# Patient Record
Sex: FEMALE | Race: WHITE | NOT HISPANIC OR LATINO | Employment: FULL TIME | URBAN - METROPOLITAN AREA
[De-identification: names, ages, dates, MRNs, and addresses within clinical notes are randomized per-mention and may not be internally consistent; named-entity substitution may affect disease eponyms.]

---

## 2017-01-11 PROBLEM — Z86.0100 HISTORY OF COLONIC POLYPS: Status: ACTIVE | Noted: 2017-01-11

## 2017-01-11 PROBLEM — Z86.010 HISTORY OF COLONIC POLYPS: Status: ACTIVE | Noted: 2017-01-11

## 2017-02-01 ENCOUNTER — APPOINTMENT (OUTPATIENT)
Dept: MEDICAL GROUP | Facility: MEDICAL CENTER | Age: 37
End: 2017-02-01
Payer: COMMERCIAL

## 2017-02-14 ENCOUNTER — OFFICE VISIT (OUTPATIENT)
Dept: MEDICAL GROUP | Facility: MEDICAL CENTER | Age: 37
End: 2017-02-14
Payer: COMMERCIAL

## 2017-02-14 VITALS
OXYGEN SATURATION: 98 % | RESPIRATION RATE: 16 BRPM | TEMPERATURE: 98.3 F | DIASTOLIC BLOOD PRESSURE: 72 MMHG | BODY MASS INDEX: 26.64 KG/M2 | HEART RATE: 72 BPM | SYSTOLIC BLOOD PRESSURE: 106 MMHG | HEIGHT: 66 IN | WEIGHT: 165.79 LBS

## 2017-02-14 DIAGNOSIS — M54.50 CHRONIC BILATERAL LOW BACK PAIN WITHOUT SCIATICA: ICD-10-CM

## 2017-02-14 DIAGNOSIS — G89.29 CHRONIC BILATERAL LOW BACK PAIN WITHOUT SCIATICA: ICD-10-CM

## 2017-02-14 DIAGNOSIS — Z13.220 SCREENING FOR HYPERLIPIDEMIA: ICD-10-CM

## 2017-02-14 DIAGNOSIS — Z13.1 SCREENING FOR DIABETES MELLITUS: ICD-10-CM

## 2017-02-14 DIAGNOSIS — Z86.010 HISTORY OF COLONIC POLYPS: ICD-10-CM

## 2017-02-14 DIAGNOSIS — F17.210 CIGARETTE SMOKER: ICD-10-CM

## 2017-02-14 DIAGNOSIS — Z30.41 ENCOUNTER FOR SURVEILLANCE OF CONTRACEPTIVE PILLS: ICD-10-CM

## 2017-02-14 DIAGNOSIS — Z30.9 ENCOUNTER FOR CONTRACEPTIVE MANAGEMENT, UNSPECIFIED CONTRACEPTIVE ENCOUNTER TYPE: ICD-10-CM

## 2017-02-14 LAB
INT CON NEG: NEGATIVE
INT CON POS: POSITIVE
POC URINE PREGNANCY TEST: NORMAL

## 2017-02-14 PROCEDURE — 99214 OFFICE O/P EST MOD 30 MIN: CPT | Performed by: PHYSICIAN ASSISTANT

## 2017-02-14 PROCEDURE — 81025 URINE PREGNANCY TEST: CPT | Performed by: PHYSICIAN ASSISTANT

## 2017-02-14 ASSESSMENT — PAIN SCALES - GENERAL: PAINLEVEL: NO PAIN

## 2017-02-14 ASSESSMENT — PATIENT HEALTH QUESTIONNAIRE - PHQ9: CLINICAL INTERPRETATION OF PHQ2 SCORE: 0

## 2017-02-14 NOTE — ASSESSMENT & PLAN NOTE
Hasn't been on for last 3mo.   Started taking OCP Cryselle for last 4 years.   Has been in long term relationship.   lmp 2/7/17, last around 5 days. Monthly. Cramping and wren with menses unless on OCP.   Las pap- 2 years ago, normal  Had chlamydia over 10 years ago, but has always been normal since with no other std.   Denies HA, CP, abdominal pain, N/V, dysuria, freq, urgency, hematuria, change in vaginal discharge, lesions or sores
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
## 2017-02-14 NOTE — ASSESSMENT & PLAN NOTE
Started smoking at age 10yo.   Quit for 8mo at age 21yo.   1/2 ppd.   Denies coughing, SOB, wheezing, CP, mouth lesions.
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
## 2017-03-27 ENCOUNTER — OFFICE VISIT (OUTPATIENT)
Dept: MEDICAL GROUP | Facility: MEDICAL CENTER | Age: 37
End: 2017-03-27
Payer: COMMERCIAL

## 2017-03-27 ENCOUNTER — HOSPITAL ENCOUNTER (OUTPATIENT)
Facility: MEDICAL CENTER | Age: 37
End: 2017-03-27
Attending: PHYSICIAN ASSISTANT
Payer: COMMERCIAL

## 2017-03-27 VITALS
HEIGHT: 65 IN | TEMPERATURE: 98.8 F | WEIGHT: 175 LBS | BODY MASS INDEX: 29.16 KG/M2 | OXYGEN SATURATION: 96 % | HEART RATE: 68 BPM | DIASTOLIC BLOOD PRESSURE: 66 MMHG | SYSTOLIC BLOOD PRESSURE: 116 MMHG

## 2017-03-27 DIAGNOSIS — Z01.419 ENCOUNTER FOR GYNECOLOGICAL EXAMINATION: ICD-10-CM

## 2017-03-27 DIAGNOSIS — Z11.51 SCREENING FOR HPV (HUMAN PAPILLOMAVIRUS): ICD-10-CM

## 2017-03-27 DIAGNOSIS — R39.15 URGENCY OF URINATION: ICD-10-CM

## 2017-03-27 DIAGNOSIS — Z12.4 SCREENING FOR CERVICAL CANCER: ICD-10-CM

## 2017-03-27 LAB
APPEARANCE UR: CLEAR
BILIRUB UR STRIP-MCNC: NORMAL MG/DL
COLOR UR AUTO: YELLOW
GLUCOSE UR STRIP.AUTO-MCNC: NORMAL MG/DL
KETONES UR STRIP.AUTO-MCNC: NORMAL MG/DL
LEUKOCYTE ESTERASE UR QL STRIP.AUTO: NORMAL
NITRITE UR QL STRIP.AUTO: NORMAL
PH UR STRIP.AUTO: 5 [PH] (ref 5–8)
PROT UR QL STRIP: NORMAL MG/DL
RBC UR QL AUTO: NORMAL
SP GR UR STRIP.AUTO: 1.01
UROBILINOGEN UR STRIP-MCNC: NORMAL MG/DL

## 2017-03-27 PROCEDURE — 81002 URINALYSIS NONAUTO W/O SCOPE: CPT | Performed by: PHYSICIAN ASSISTANT

## 2017-03-27 PROCEDURE — 99395 PREV VISIT EST AGE 18-39: CPT | Performed by: PHYSICIAN ASSISTANT

## 2017-03-27 PROCEDURE — 88175 CYTOPATH C/V AUTO FLUID REDO: CPT

## 2017-03-27 PROCEDURE — 87086 URINE CULTURE/COLONY COUNT: CPT

## 2017-03-27 PROCEDURE — 87624 HPV HI-RISK TYP POOLED RSLT: CPT

## 2017-03-27 NOTE — PROGRESS NOTES
SUBJECTIVE: 36 y.o. female for annual routine gynecologic exam  Chief Complaint   Patient presents with   • Gynecologic Exam       Obstetric History     No data available      Last Pap: 1/4/16 normal other than BV.   Didn't want treatment. Stated it doesn't bother her   History   Sexual Activity   • Sexual Activity:   • Partners: Male   • Birth Control/ Protection: Pill     H/O Abnormal Pap yes, CH when in 20's treated. No other abnormal. No SG/LN  She is taking OCP and has been doing much better. Monthly menses, mood is more stable.     She has noticed mild urgency of urination from several weeks otherwise no other changes.   She drinks some water during the week and only pepsi and coffee on weekends.   Denies fever, chills, flank pain, pelvic pain, suprapubic pain, dysuria, frequency, hematuria, change in vaginal discharge.    She  reports that she has been smoking Cigarettes.  She has been smoking about 0.50 packs per day. She has never used smokeless tobacco.    LMP Date: 03/08/17, 4-5 days if on OCP.   Allergies: Food; Amoxicillin; Codeine; and Septra     ROS:    Menses every month with 4-5 days    Cramping is moderate.   She does take OTC analgesics for cramping  No significant bloating/fluid retention, pelvic pain, or dyspareunia. No vaginal discharge   No breast tenderness, mass, nipple discharge, changes in size or contour, or abnormal cyclic discomfort.  Reports no menopause symptoms of hot flashes, night sweats, sleep disruption, mood changes.Denies vaginal dryness.   + urgency but neg freq, hematuria, dysuria, no incontinence.   No abdominal pain, change in bowel habits, black or bloody stools.    No unusual headaches, no visual changes, menstrual migraines   No prolonged cough. No dyspnea or chest pain on exertion.  No depression, labile mood, anxiety, libido changes, insomnia.  No polydipsia, polyuria, temperature intolerance.  No new/concerning skin lesions, concerns.     Exercise: through work, very  "active.  5 days weekly.   Preventive Care:  Colonoscopy- 1/10/17 next due 12 years.,   PAP- 1/4/16.   Flu- declines  TDap- 2005.     Current medicines (including changes today)  Current Outpatient Prescriptions   Medication Sig Dispense Refill   • norgestrel-ethinyl estradiol (CRYSELLE-28) 0.3-30 MG-MCG Tab Take 1 Tab by mouth every day. 84 Each 3   • methocarbamol (ROBAXIN) 750 MG Tab Take 1 Tab by mouth 3 times a day as needed. 30 Tab 0   • tramadol (ULTRAM) 50 MG Tab Take 1-2 Tabs by mouth every 6 hours as needed (pain). 20 Tab 0     No current facility-administered medications for this visit.     She  has a past medical history of GERD (gastroesophageal reflux disease).  She  has no past surgical history on file.     Family History:   Family History   Problem Relation Age of Onset   • Alcohol/Drug Sister      etoh   • Cancer Maternal Aunt      uterin ca   • Diabetes Neg Hx    • Heart Disease Neg Hx    • Hypertension Neg Hx    • Hyperlipidemia Neg Hx           OBJECTIVE:   /66 mmHg  Pulse 68  Temp(Src) 37.1 °C (98.8 °F)  Ht 1.651 m (5' 5\")  Wt 79.379 kg (175 lb)  BMI 29.12 kg/m2  SpO2 96%  LMP 03/08/2017  Breastfeeding? No  Body mass index is 29.12 kg/(m^2).    HEAD AND NECK:  Ears normal.  Throat, oral cavity and tongue normal.  Neck supple. No adenopathy or masses in the neck or supraclavicular regions.  No carotid bruits. No thyromegaly. NEURO: Cranial nerves are normal. DTR's normal and symmetric.  CHEST:  Clear, good air entry, no wheezes or rales. HEART:  Regular rate and rhythm.  S1 and S2 normal.  No edema or JVD. ABDOMEN:  Soft without tenderness, guarding, mass or organomegaly.  No CVA tenderness or inguinal adenopathy. EXTREMITIES:  Extremities, reflexes and peripheral pulses are normal. SKIN: color normal, vascularity normal, no edema, temperature normal   No rashes or suspicious skin lesions noted.     Breast Exam: Performed with instruction during examination. No axillary " lymphadenopathy, no skin changes, no dominant masses. No nipple retraction  Pelvic Exam -  Normal external genitalia with no lesions. Normal vaginal mucosa with normal rugation and scant discharge. Cervix with no visible lesions. No cervical motion tenderness. Uterus is normal sized with no masses. No adnexal tenderness or enlargement appreciated. Thin Prep Pap is obtained, vaginal swab is not obtained and specimen(s) sent to lab    <ASSESSMENT and PLAN>    1. Encounter for gynecological examination  Pap smear with HPV cotesting collected today    2. Screening for cervical cancer  Pap smear with HPV cotesting collected today    3. Screening for HPV (human papillomavirus)  Pap smear with HPV cotesting collected today    4. Need for Tdap  State she will come back for that defers at this time.     5. Urgency of urination  Possibly just from all of the caffeinated beverages although UA was positive for moderate blood. Otherwise it was negative.  We will send for culture, recommend increasing water intake and decreasing caffeinated beverages.  Patient is otherwise asymptomatic, she has no flank pain and no abdominal discomfort.  She is not on her menstrual period.     Discussed  feminine hygiene, family planning choices, adequate intake of calcium and vitamin D, diet and exercise, Kegel's exercises   Follow-up in 1 years for next Gyn exam and 3 years for next Pap.   Next office visit for recheck of chronic medical conditions is due in 3 months    Please note that this dictation was created using voice recognition software. I have made every reasonable attempt to correct obvious errors, but I expect that there are errors of grammar and possibly content that I did not discover before finalizing the note.

## 2017-03-28 LAB
CYTOLOGY REG CYTOL: NORMAL
HPV HR 12 DNA CVX QL NAA+PROBE: NEGATIVE
HPV16 DNA SPEC QL NAA+PROBE: NEGATIVE
HPV18 DNA SPEC QL NAA+PROBE: NEGATIVE
SPECIMEN SOURCE: NORMAL

## 2017-03-30 LAB
BACTERIA UR CULT: NORMAL
SIGNIFICANT IND 70042: NORMAL
SITE SITE: NORMAL
SOURCE SOURCE: NORMAL

## 2017-03-31 ENCOUNTER — TELEPHONE (OUTPATIENT)
Dept: MEDICAL GROUP | Facility: MEDICAL CENTER | Age: 37
End: 2017-03-31

## 2017-03-31 DIAGNOSIS — R31.29 MICROSCOPIC HEMATURIA: ICD-10-CM

## 2017-03-31 NOTE — TELEPHONE ENCOUNTER
Patient informed of msg below. Patient would like to know results for urine culture please advise.

## 2017-03-31 NOTE — TELEPHONE ENCOUNTER
----- Message from Jihan Sifuentes PA-C sent at 3/31/2017  6:56 AM PDT -----  Please inform patient of her Pap was normal  Recommend annual visits and Pap in 3 years.  Thank you  Jihan

## 2017-03-31 NOTE — TELEPHONE ENCOUNTER
Urine culture was normal without any bacteria or infection noted.  I would like to send referral to urology or have her return recheck to make sure that the microscopic blood resolves.  Thank you  Jihan

## 2018-01-09 DIAGNOSIS — Z30.41 ENCOUNTER FOR SURVEILLANCE OF CONTRACEPTIVE PILLS: ICD-10-CM

## 2018-01-09 NOTE — TELEPHONE ENCOUNTER
Was the patient seen in the last year in this department? Yes Lov 03/27/2017    Does patient have an active prescription for medications requested? No     Received Request Via: Pharmacy

## 2018-01-11 RX ORDER — NORGESTREL-ETHINYL ESTRADIOL 0.3-0.03MG
1 TABLET ORAL
Qty: 84 TAB | Refills: 3 | Status: SHIPPED | OUTPATIENT
Start: 2018-01-11 | End: 2018-12-03 | Stop reason: SDUPTHER

## 2018-02-18 ENCOUNTER — HOSPITAL ENCOUNTER (EMERGENCY)
Facility: MEDICAL CENTER | Age: 38
End: 2018-02-18
Attending: EMERGENCY MEDICINE
Payer: COMMERCIAL

## 2018-02-18 VITALS
RESPIRATION RATE: 18 BRPM | OXYGEN SATURATION: 97 % | HEIGHT: 67 IN | WEIGHT: 181.88 LBS | TEMPERATURE: 98.3 F | BODY MASS INDEX: 28.55 KG/M2 | SYSTOLIC BLOOD PRESSURE: 138 MMHG | HEART RATE: 99 BPM | DIASTOLIC BLOOD PRESSURE: 88 MMHG

## 2018-02-18 DIAGNOSIS — S61.212D LACERATION OF RIGHT MIDDLE FINGER WITHOUT FOREIGN BODY WITHOUT DAMAGE TO NAIL, SUBSEQUENT ENCOUNTER: ICD-10-CM

## 2018-02-18 PROCEDURE — 304999 HCHG REPAIR-SIMPLE/INTERMED LEVEL 1

## 2018-02-18 PROCEDURE — 700101 HCHG RX REV CODE 250: Performed by: EMERGENCY MEDICINE

## 2018-02-18 PROCEDURE — 99284 EMERGENCY DEPT VISIT MOD MDM: CPT

## 2018-02-18 PROCEDURE — 700111 HCHG RX REV CODE 636 W/ 250 OVERRIDE (IP): Performed by: EMERGENCY MEDICINE

## 2018-02-18 PROCEDURE — 303747 HCHG EXTRA SUTURE

## 2018-02-18 PROCEDURE — 304217 HCHG IRRIGATION SYSTEM

## 2018-02-18 PROCEDURE — 90715 TDAP VACCINE 7 YRS/> IM: CPT | Performed by: EMERGENCY MEDICINE

## 2018-02-18 PROCEDURE — 90471 IMMUNIZATION ADMIN: CPT

## 2018-02-18 RX ORDER — CLINDAMYCIN HYDROCHLORIDE 150 MG/1
150 CAPSULE ORAL 3 TIMES DAILY
Qty: 30 CAP | Refills: 0 | Status: SHIPPED
Start: 2018-02-18 | End: 2019-05-28

## 2018-02-18 RX ORDER — LIDOCAINE HYDROCHLORIDE 10 MG/ML
20 INJECTION, SOLUTION INFILTRATION; PERINEURAL ONCE
Status: COMPLETED | OUTPATIENT
Start: 2018-02-18 | End: 2018-02-18

## 2018-02-18 RX ADMIN — CLOSTRIDIUM TETANI TOXOID ANTIGEN (FORMALDEHYDE INACTIVATED), CORYNEBACTERIUM DIPHTHERIAE TOXOID ANTIGEN (FORMALDEHYDE INACTIVATED), BORDETELLA PERTUSSIS TOXOID ANTIGEN (GLUTARALDEHYDE INACTIVATED), BORDETELLA PERTUSSIS FILAMENTOUS HEMAGGLUTININ ANTIGEN (FORMALDEHYDE INACTIVATED), BORDETELLA PERTUSSIS PERTACTIN ANTIGEN, AND BORDETELLA PERTUSSIS FIMBRIAE 2/3 ANTIGEN 0.5 ML: 5; 2; 2.5; 5; 3; 5 INJECTION, SUSPENSION INTRAMUSCULAR at 21:20

## 2018-02-18 RX ADMIN — LIDOCAINE HYDROCHLORIDE 20 ML: 10 INJECTION, SOLUTION INFILTRATION; PERINEURAL at 21:00

## 2018-02-18 ASSESSMENT — LIFESTYLE VARIABLES
DO YOU DRINK ALCOHOL: YES
HAVE PEOPLE ANNOYED YOU BY CRITICIZING YOUR DRINKING: NO
EVER HAD A DRINK FIRST THING IN THE MORNING TO STEADY YOUR NERVES TO GET RID OF A HANGOVER: NO
HAVE YOU EVER FELT YOU SHOULD CUT DOWN ON YOUR DRINKING: NO

## 2018-02-18 ASSESSMENT — PAIN SCALES - GENERAL: PAINLEVEL_OUTOF10: 6

## 2018-02-19 NOTE — ED NOTES
Pt brought back to rm PUR 75 from triage. Agree with triage assessment, wound exposed, bleeding controlled, denies numbness or tingling. Lac cart at bedside. Pt able to transfer self to bed, family at bedside, call light in reach. Chart up for ERP.

## 2018-02-19 NOTE — DISCHARGE INSTRUCTIONS
Sutured Wound Care  Sutures are stitches that can be used to close wounds. Taking care of your wound properly can help to prevent pain and infection. It can also help your wound to heal more quickly.  HOW TO CARE FOR YOUR SUTURED WOUND  Wound Care  · Keep the wound clean and dry.  · If you were given a bandage (dressing), you should change it at least once per day or as directed by your health care provider. You should also change it if it becomes wet or dirty.  · Keep the wound completely dry for the first 24 hours or as directed by your health care provider. After that time, you may shower or bathe. However, make sure that the wound is not soaked in water until the sutures have been removed.  · Clean the wound one time each day or as directed by your health care provider.  ¨ Wash the wound with soap and water.  ¨ Rinse the wound with water to remove all soap.  ¨ Pat the wound dry with a clean towel. Do not rub the wound.  · After cleaning the wound, apply a thin layer of antibiotic ointment as directed by your health care provider. This will help to prevent infection and keep the dressing from sticking to the wound.  · Have the sutures removed as directed by your health care provider.  General Instructions  · Take or apply medicines only as directed by your health care provider.  · To help prevent scarring, make sure to cover your wound with sunscreen whenever you are outside after the sutures are removed and the wound is healed. Make sure to wear a sunscreen of at least 30 SPF.  · If you were prescribed an antibiotic medicine or ointment, finish all of it even if you start to feel better.  · Do not scratch or pick at the wound.  · Keep all follow-up visits as directed by your health care provider. This is important.  · Check your wound every day for signs of infection. Watch for:    ¨ Redness, swelling, or pain.  ¨ Fluid, blood, or pus.  · Raise (elevate) the injured area above the level of your heart while you  are sitting or lying down, if possible.  · Avoid stretching your wound.  · Drink enough fluids to keep your urine clear or pale yellow.  SEEK MEDICAL CARE IF:  · You received a tetanus shot and you have swelling, severe pain, redness, or bleeding at the injection site.  · You have a fever.  · A wound that was closed breaks open.  · You notice a bad smell coming from the wound.  · You notice something coming out of the wound, such as wood or glass.  · Your pain is not controlled with medicine.  · You have increased redness, swelling, or pain at the site of your wound.  · You have fluid, blood, or pus coming from your wound.  · You notice a change in the color of your skin near your wound.  · You need to change the dressing frequently due to fluid, blood, or pus draining from the wound.  · You develop a new rash.  · You develop numbness around the wound.  SEEK IMMEDIATE MEDICAL CARE IF:  · You develop severe swelling around the injury site.  · Your pain suddenly increases and is severe.  · You develop painful lumps near the wound or on skin that is anywhere on your body.  · You have a red streak going away from your wound.  · The wound is on your hand or foot and you cannot properly move a finger or toe.  · The wound is on your hand or foot and you notice that your fingers or toes look pale or bluish.     This information is not intended to replace advice given to you by your health care provider. Make sure you discuss any questions you have with your health care provider.     Document Released: 01/25/2006 Document Revised: 05/03/2016 Document Reviewed: 12/14/2015  [employer] Patient Education ©2016 [employer]    Have your sutures removed in 7-10 days. Tetanus and Diphtheria Vaccine  Your caregiver has suggested that you receive an immunization to prevent tetanus (lockjaw) and diphtheria. Tetanus and diphtheria are serious and deadly infectious diseases of the past that have been nearly wiped out by  "modern immunizations. Td or DT vaccines (shots) are the immunizations given to help prevent these illnesses. Td is the medical term for a standard tetanus dose, small diphtheria dose. DT means both in standard doses.  ABOUT THE DISEASES  Tetanus (lockjaw) and diphtheria are serious diseases. Tetanus is caused by a germ that lives in the soil. It enters the body through a cut or wound, often caused by a nail or broken piece of glass. You cannot catch tetanus from another person. Diphtheria spreads when germs pass from an infected person to the nose or throat of others.  Tetanus causes serious, painful spasms of all muscles. It can lead to:  · \"Locking\" of the muscles of the jaw and throat, so the patient cannot open his or her mouth or swallow.  · Damage to the heart muscle.  Diphtheria causes a thick coating in the nose, throat, or airway. It can lead to:  · Breathing problems.  · Kidney problems.  · Heart failure.  · Paralysis.  · Death.  ABOUT THE VACCINES   A vaccine is a shot (immunization) that can help prevent a disease. Vaccines have helped lower the rates of getting certain diseases. If people stopped getting vaccinated, more people would develop illnesses.  These vaccines can be used in three ways:  · As catch-up for people who did not get all their doses when they were children.  · As a booster dose every 10 years.  · For protection against tetanus infection, after a wound.  Benefits of the vaccines  Vaccination is the best way to protect against tetanus and diphtheria. Because of vaccination, there are fewer cases of these diseases. Cases are rare in children because most get a routine vaccination with DTP (Diphtheria, Tetanus, and Pertussis), DTaP (Diphtheria, Tetanus, and acellular Pertussis), or DT (Diphtheria and Tetanus) vaccines. There would be many more cases if we stopped vaccinating people. Tetanus kills about 1 in 5 people who are infected.  WHEN SHOULD YOU GET TD VACCINE?  · Td is made for " people 7 years of age and older.  · People who have not gotten at least 3 doses of any tetanus and diphtheria vaccine (DTP, DTaP or DT) during their lifetime should do so using Td. After a person gets the third dose, a Td dose is needed every 10 years all through life. This is because protection fades over time. Booster shots are needed every 10 years.  · Other vaccines may be given at the same time as Td.  You may not know today whether your immunizations are current. The vaccine given today is to protect you from your next cut or injury. It does not offer protection for the current injury. An immune globulin injection may be given, if protection is needed immediately. Check with your caregiver later regarding your immunization status.  Tell your caregiver if the person getting the vaccine:  · Has ever had a serious allergic reaction or other problem with Td, or any other tetanus and diphtheria vaccine (DTP, DTaP, or DT). People who have had a serious allergic reaction should not receive the vaccine.  · Has epilepsy or another nervous system illness.  · Has had Guillain [place] Syndrome (GBS) in the past.  · Now has a moderate or severe illness.  · Is pregnant.  · If you are not sure, ask your caregiver.  WHAT ARE THE RISKS FROM TD VACCINE?  · As with any medicine, there are very small risks that serious problems, even death, could occur after getting a vaccine. However, the risk of a serious side effect from the vaccine is almost zero.  · The risks from the vaccine are much smaller than the risks from the diseases, if people stopped getting vaccinated. Both diseases can cause serious health problems, which are prevented by the vaccine.  · Almost all people who get Td have no problems from it.  Mild problems  If mild problems occur, they usually start within hours to a day or two after vaccination. They may last 1-2 days:  · Soreness, redness, or swelling where the shot was given.  · Headache or  tiredness.  · Occasionally, a low grade fever.  These problems can be worse in adults who get Td vaccine very often. Non-aspirin medicines may be used to reduce soreness.  Severe problems  These problems happen very rarely:  · Serious allergic reaction (at most, occurs in 1 in 1 million vaccinated persons). This occurs almost immediately, and is treatable with medicines. Signs of a serious allergic reaction include:  · Difficulty breathing.  · Hoarseness or wheezing.  · Hives.  · Dizziness.  · Deep, aching pain and muscle wasting in upper arm(s).  Overall, the benefits to you and your family from these vaccines are far greater than the risk.  WHAT TO DO IF THERE IS A SERIOUS REACTION:  · Call a caregiver or get the person to a doctor or emergency room right away.  · Write down what happened, the date and time it happened, and tell your caregiver.  · Ask your caregiver to file a Vaccine Adverse Event Report form or call, toll-free: [phone]  If you want to learn more about this vaccine, ask your caregiver. She/he can give you the vaccine package insert or suggest other sources of information. Also, the National Vaccine Injury Compensation Program gives compensation (payment) for persons thought to be injured by vaccines. For details call, toll-free: (653) 925-3361.  Document Released: 12/15/2001 Document Revised: 03/11/2013 Document Reviewed: 11/04/2010  "[employer]® Patient Information ©2014 [employer].

## 2018-02-19 NOTE — ED PROVIDER NOTES
"ED Provider Note    CHIEF COMPLAINT  Chief Complaint   Patient presents with   • Hand Laceration     Pt cleaning her gun and hook on barrel slide cut her finger, approx 1 cm lac sustained to R middle finger. Bleeding controlled at this time, pt arrives with finger wrapped.        HPI  Kalyn Addison is a 37 y.o. female here for evaluation of a right finger laceration.  Pt is right handed, and cleaning her gun, when it slipped out of her hand.  No fever. No vomiting. No other injuries.      PAST MEDICAL HISTORY   has a past medical history of GERD (gastroesophageal reflux disease).    SOCIAL HISTORY  Social History     Social History Main Topics   • Smoking status: Current Every Day Smoker     Packs/day: 0.50     Types: Cigarettes   • Smokeless tobacco: Never Used   • Alcohol use 0.0 oz/week      Comment: 2 drinks weekly   • Drug use: No   • Sexual activity: Yes     Partners: Male     Birth control/ protection: Pill       SURGICAL HISTORY  patient denies any surgical history    CURRENT MEDICATIONS  Home Medications     Reviewed by Monica Chin R.N. (Registered Nurse) on 02/18/18 at 2033  Med List Status: Complete   Medication Last Dose Status   CRYSELLE-28 0.3-30 MG-MCG Tab 2/18/2018 Active                ALLERGIES  Allergies   Allergen Reactions   • Food      Rye  Causes swelling in the throat   • Amoxicillin Vomiting   • Codeine Rash   • Septra [Bactrim] Rash       REVIEW OF SYSTEMS  See HPI for further details. Review of systems as above, otherwise all other systems are negative.     PHYSICAL EXAM  VITAL SIGNS: /73   Pulse 99   Temp 36.8 °C (98.3 °F)   Resp 16   Ht 1.702 m (5' 7\")   Wt 82.5 kg (181 lb 14.1 oz)   SpO2 95%   BMI 28.49 kg/m²     Constitutional: Well developed, well nourished. No acute distress.  HEENT: Normocephalic, atraumatic. MMM  Neck: Supple, Full range of motion   Chest/Pulmonary:  No respiratory distress.  Equal expansion   Musculoskeletal: No deformity, no edema, " neurovascular intact.   Right hand:  Dorsal aspect middle digit, with 1cm linear laceration.  No active bleed.  N/V intact distally.  Flex/ext intact.   Neuro: Clear speech, appropriate, cooperative, cranial nerves II-XII grossly intact.  Psych: Normal mood and affect      PROCEDURES   LACERATION REPAIR PROCEDURE NOTE  The patient's identification was confirmed and consent was obtained.  This procedure was performed by  Student doctor anthony bergeron, with my observation and intervention.  Site: right middle finger   Sterile procedures observed;yes    Anesthetic used (type and amt): lidocaine without epi  Suture type/size: 5.0 eithilon  Length:1cm  # of Sutures:  2  Technique:interrupted  Complexity ;  simple  Antibx ointment applied; yes  Tetanus UTD or ordered; yes  Splint placed.   Site anesthetized, irrigated with NS, explored without evidence of foreign body, wound well approximated, site covered with dry, sterile dressing. Patient tolerated procedure well without complications. Instructions for care discussed verbally and patient provided with additional written instructions for homecare and f/u.      MEDICAL RECORD  I have reviewed patient's medical record and pertinent results are listed above.    COURSE & MEDICAL DECISION MAKING  I have reviewed any medical record information, laboratory studies and radiographic results as noted above.    I you have had any blood pressure issues while here in the emergency department, please see your doctor for a further evaluation or work up.    Differential diagnoses include but not limited to: laceration     This patient presents with a right hand laceration and tetanus update.  At this time, I have counseled the patient/family regarding their medications, pain control, and follow up.  They will continue their medications, if any, as prescribed.  They will return immediately for any worsening symptoms and/or any other medical concerns.  They will see their doctor, or  contact the doctor provided, in 1-2 days for follow up.       FINAL IMPRESSION  1. Laceration of right middle finger without foreign body without damage to nail, subsequent encounter    2.      Tetanus update        Electronically signed by: Karthik Lassiter, 2/18/2018 8:42 PM

## 2018-02-19 NOTE — ED TRIAGE NOTES
"Chief Complaint   Patient presents with   • Hand Laceration     Pt cleaning her gun and hook on barrel slide cut her finger, approx 1 cm lac sustained to R middle finger. Bleeding controlled at this time, pt arrives with finger wrapped.      /73   Pulse 99   Temp 36.8 °C (98.3 °F)   Resp 16   Ht 1.702 m (5' 7\")   Wt 82.5 kg (181 lb 14.1 oz)   SpO2 95%   BMI 28.49 kg/m²     Pt ambulatory to triage, steady on feet. Presents for above complaint. Pt returned to [facility], educated on triage process and wait times, instructed to notify staff for worsening symptoms/bleeding.   "

## 2018-02-19 NOTE — ED NOTES
Pts tetanus updated. Pt given discharge instructions. Pt verbalized understanding. RN to answer any questions pt and family had.  VSS. Pt ambulated out to front lobby.

## 2018-02-25 ENCOUNTER — OFFICE VISIT (OUTPATIENT)
Dept: URGENT CARE | Facility: PHYSICIAN GROUP | Age: 38
End: 2018-02-25
Payer: COMMERCIAL

## 2018-02-25 VITALS
HEIGHT: 67 IN | HEART RATE: 72 BPM | DIASTOLIC BLOOD PRESSURE: 80 MMHG | OXYGEN SATURATION: 99 % | WEIGHT: 182 LBS | RESPIRATION RATE: 14 BRPM | SYSTOLIC BLOOD PRESSURE: 118 MMHG | TEMPERATURE: 97.5 F | BODY MASS INDEX: 28.56 KG/M2

## 2018-02-25 DIAGNOSIS — Z48.02 VISIT FOR SUTURE REMOVAL: ICD-10-CM

## 2018-02-25 PROCEDURE — 99212 OFFICE O/P EST SF 10 MIN: CPT | Performed by: PHYSICIAN ASSISTANT

## 2018-02-25 ASSESSMENT — ENCOUNTER SYMPTOMS
CHILLS: 0
TINGLING: 0
SENSORY CHANGE: 0
FOCAL WEAKNESS: 0
FEVER: 0

## 2018-02-25 NOTE — PROGRESS NOTES
"Subjective:      Kalyn Addison is a 37 y.o. female who presents with Suture / Staple Removal (stitch removal )            Suture / Staple Removal   The sutures were placed 7 to 10 days ago (7 days ago. Suture placed in right middle finger). She tried oral antibiotics and regular soap and water washings since the wound repair. The treatment provided significant relief. There has been no drainage from the wound. There is no redness present. There is no swelling present. There is no pain present. She has no difficulty moving the affected extremity or digit.       Past Medical History:   Diagnosis Date   • GERD (gastroesophageal reflux disease)        History reviewed. No pertinent surgical history.    Family History   Problem Relation Age of Onset   • Alcohol/Drug Sister      etoh   • Cancer Maternal Aunt      uterin ca   • Diabetes Neg Hx    • Heart Disease Neg Hx    • Hypertension Neg Hx    • Hyperlipidemia Neg Hx        Allergies   Allergen Reactions   • Food      Rye  Causes swelling in the throat   • Amoxicillin Vomiting   • Codeine Rash   • Septra [Bactrim] Rash       Medications, Allergies, and current problem list reviewed today in Epic    Review of Systems   Constitutional: Negative for chills, fever and malaise/fatigue.   Musculoskeletal: Negative for joint pain.   Skin:        Laceration to right middle finger- 2 sutures in place     Neurological: Negative for tingling, sensory change and focal weakness.     All other systems reviewed and are negative.        Objective:     /80   Pulse 72   Temp 36.4 °C (97.5 °F)   Resp 14   Ht 1.702 m (5' 7\")   Wt 82.6 kg (182 lb)   SpO2 99%   BMI 28.51 kg/m²      Physical Exam   Constitutional: She is oriented to person, place, and time. She appears well-developed and well-nourished. No distress.   Pulmonary/Chest: Effort normal. No respiratory distress.   Musculoskeletal:        Hands:  Neurological: She is alert and oriented to person, place, and time. No " cranial nerve deficit.   Psychiatric: She has a normal mood and affect. Her behavior is normal. Judgment and thought content normal.               Assessment/Plan:     1. Visit for suture removal    2 sutures removed- slight upper layer wound dehiscence.  1 small steri strip placed for reinforcement.     Differential diagnoses, Supportive care, and indications for immediate follow-up discussed with patient.   Instructed to return to clinic or nearest emergency department for any change in condition, further concerns, or worsening of symptoms.    The patient demonstrated a good understanding and agreed with the treatment plan.    Caro Carlson P.A.-C.

## 2018-03-31 ENCOUNTER — OFFICE VISIT (OUTPATIENT)
Dept: URGENT CARE | Facility: PHYSICIAN GROUP | Age: 38
End: 2018-03-31
Payer: COMMERCIAL

## 2018-03-31 VITALS
DIASTOLIC BLOOD PRESSURE: 76 MMHG | BODY MASS INDEX: 28.25 KG/M2 | HEART RATE: 89 BPM | WEIGHT: 180 LBS | SYSTOLIC BLOOD PRESSURE: 122 MMHG | OXYGEN SATURATION: 96 % | RESPIRATION RATE: 16 BRPM | TEMPERATURE: 98.4 F | HEIGHT: 67 IN

## 2018-03-31 DIAGNOSIS — L30.9 DERMATITIS: ICD-10-CM

## 2018-03-31 PROCEDURE — 99213 OFFICE O/P EST LOW 20 MIN: CPT | Performed by: NURSE PRACTITIONER

## 2018-03-31 ASSESSMENT — ENCOUNTER SYMPTOMS
MYALGIAS: 0
NAUSEA: 0
SINUS PAIN: 0
SHORTNESS OF BREATH: 0
NEUROLOGICAL NEGATIVE: 1
RESPIRATORY NEGATIVE: 1
SORE THROAT: 0
CHILLS: 0
PALPITATIONS: 0
WHEEZING: 0
CARDIOVASCULAR NEGATIVE: 1
DIZZINESS: 0
WEAKNESS: 0
HEADACHES: 0
GASTROINTESTINAL NEGATIVE: 1
FEVER: 0
VOMITING: 0
EYES NEGATIVE: 1
ORTHOPNEA: 0

## 2018-03-31 NOTE — PROGRESS NOTES
"Subjective:      Kalyn Addison is a 37 y.o. female who presents with Rash (was on antibiotic when the rash started x 3 weeks )            HPI  Kalyn is here for rash x 3 weeks. See student notes for exam and findings.    PMH:  has a past medical history of GERD (gastroesophageal reflux disease).  MEDS:   Current Outpatient Prescriptions:   •  CRYSELLE-28 0.3-30 MG-MCG Tab, TAKE 1 TAB BY MOUTH EVERY DAY., Disp: 84 Tab, Rfl: 3  •  clindamycin (CLEOCIN) 150 MG Cap, Take 1 Cap by mouth 3 times a day., Disp: 30 Cap, Rfl: 0  ALLERGIES:   Allergies   Allergen Reactions   • Food      Rye  Causes swelling in the throat   • Amoxicillin Vomiting   • Clindamycin Rash     Rash     • Codeine Rash   • Septra [Bactrim] Rash     SURGHX: History reviewed. No pertinent surgical history.  SOCHX:  reports that she has been smoking Cigarettes.  She has been smoking about 0.50 packs per day. She has never used smokeless tobacco. She reports that she drinks alcohol. She reports that she does not use drugs.  FH: Family history was reviewed, no pertinent findings to report    Review of Systems   Constitutional: Negative for chills, fever and malaise/fatigue.   HENT: Negative for congestion, ear pain, sinus pain and sore throat.    Respiratory: Negative for shortness of breath and wheezing.    Cardiovascular: Negative for chest pain, palpitations and orthopnea.   Gastrointestinal: Negative for nausea and vomiting.   Musculoskeletal: Negative for myalgias.   Skin: Positive for itching and rash.   Neurological: Negative for dizziness, weakness and headaches.   Endo/Heme/Allergies: Negative for environmental allergies.   All other systems reviewed and are negative.         Objective:     /76   Pulse 89   Temp 36.9 °C (98.4 °F)   Resp 16   Ht 1.702 m (5' 7\")   Wt 81.6 kg (180 lb)   LMP 03/10/2018   SpO2 96%   BMI 28.19 kg/m²      Physical Exam   Constitutional: She appears well-developed and well-nourished. No distress.   Skin: She is " not diaphoretic.   Vitals reviewed.              Assessment/Plan:     1. Dermatitis    I concur with student notes and findings, recommendations.

## 2018-03-31 NOTE — NON-PROVIDER
CC: had concerns including Rash (was on antibiotic when the rash started x 3 weeks ).    HPI: Patient presents for new rash present for the last 3 weeks. Patient states rash began when she started taking clindamycin for a laceration to her finger. The rash appeared on her leg, she notfied the prescribing provider office who directed her pharmacist for recommendation. Pharmacy advised patient that a small rash is an expected side effect of this medication and she should continue to take it as prescribed. Patient followed this instruction. Rash has remained. Lesion over left thigh has resolved, patient states abdominal lesions remain, patient describes as itchy, not just at site of lesions but generally. New singular lesion on right upper arm appeared today. Denies lesions being fluid filled, raised or in any way different than they appear today. Patient has not tried any home remedies for this condition. Has multiple antibiotic allergies, has epi-pen and benadryl to use PRN. She states she is sensitive to multiple medications. Benadryl causes severe sedation so she avoid use unless absolutely necessary.    PMH/PSH:  has a past medical history of GERD (gastroesophageal reflux disease).    Social:  reports that she has been smoking Cigarettes.  She has been smoking about 0.50 packs per day. She has never used smokeless tobacco. She reports that she drinks alcohol. She reports that she does not use drugs.    Current Outpatient Prescriptions on File Prior to Visit   Medication Sig Dispense Refill   • CRYSELLE-28 0.3-30 MG-MCG Tab TAKE 1 TAB BY MOUTH EVERY DAY. 84 Tab 3   • clindamycin (CLEOCIN) 150 MG Cap Take 1 Cap by mouth 3 times a day. 30 Cap 0     No current facility-administered medications on file prior to visit.      Allergies: Amoxicillin; Codeine; and Septra [bactrim]    Review of Systems   Constitutional: Negative for chills and fever.   HENT: Negative.    Eyes: Negative.    Respiratory: Negative.     Cardiovascular: Negative.    Gastrointestinal: Negative.    Genitourinary: Negative.    Skin: Positive for itching and rash.   Neurological: Negative.    All other systems reviewed and are negative.    Physical Exam   Constitutional: She is oriented to person, place, and time and well-developed, well-nourished, and in no distress. Vital signs are normal.   HENT:   Head: Normocephalic.   Eyes: Pupils are equal, round, and reactive to light.   Cardiovascular: Normal rate and regular rhythm.    Pulmonary/Chest: Effort normal.   Neurological: She is oriented to person, place, and time.   Skin: Skin is warm and dry. Rash noted.            Assessment/Plan:    1. Dermatitis    Possible initial reaction to clindamycin, has had rash reactions to other antibiotics in the past.     Offered patient oral steroid course for rash, patient declined due to concerns about medication sensitivities. Advised patient to take OTC benadryl for 2 days, OTC allegra for 1 week, OTC Pepcid for 1 week and OTC cortisone cream to affected areas twice per day until cleared. Patient to return to clinic in 1 week if not improved.

## 2018-09-03 ENCOUNTER — OFFICE VISIT (OUTPATIENT)
Dept: URGENT CARE | Facility: PHYSICIAN GROUP | Age: 38
End: 2018-09-03
Payer: COMMERCIAL

## 2018-09-03 VITALS
SYSTOLIC BLOOD PRESSURE: 112 MMHG | BODY MASS INDEX: 28.98 KG/M2 | TEMPERATURE: 98.4 F | DIASTOLIC BLOOD PRESSURE: 68 MMHG | WEIGHT: 185 LBS | HEART RATE: 75 BPM | OXYGEN SATURATION: 98 % | RESPIRATION RATE: 16 BRPM

## 2018-09-03 DIAGNOSIS — B35.3 TINEA PEDIS OF BOTH FEET: ICD-10-CM

## 2018-09-03 DIAGNOSIS — L30.9 DERMATITIS: ICD-10-CM

## 2018-09-03 PROCEDURE — 99214 OFFICE O/P EST MOD 30 MIN: CPT | Performed by: FAMILY MEDICINE

## 2018-09-03 RX ORDER — PRENATAL VIT 91/IRON/FOLIC/DHA 28-975-200
COMBINATION PACKAGE (EA) ORAL
Qty: 1 TUBE | Refills: 0 | Status: SHIPPED | OUTPATIENT
Start: 2018-09-03 | End: 2019-05-28

## 2018-09-03 RX ORDER — TRIAMCINOLONE ACETONIDE 1 MG/G
OINTMENT TOPICAL
Qty: 1 TUBE | Refills: 1 | Status: SHIPPED | OUTPATIENT
Start: 2018-09-03 | End: 2019-05-28

## 2018-09-06 ENCOUNTER — OFFICE VISIT (OUTPATIENT)
Dept: URGENT CARE | Facility: PHYSICIAN GROUP | Age: 38
End: 2018-09-06
Payer: COMMERCIAL

## 2018-09-06 VITALS
HEIGHT: 67 IN | SYSTOLIC BLOOD PRESSURE: 108 MMHG | OXYGEN SATURATION: 96 % | BODY MASS INDEX: 29.03 KG/M2 | HEART RATE: 74 BPM | DIASTOLIC BLOOD PRESSURE: 72 MMHG | RESPIRATION RATE: 18 BRPM | TEMPERATURE: 98.2 F | WEIGHT: 185 LBS

## 2018-09-06 DIAGNOSIS — L28.2 PRURITIC RASH: ICD-10-CM

## 2018-09-06 PROCEDURE — 99213 OFFICE O/P EST LOW 20 MIN: CPT | Performed by: EMERGENCY MEDICINE

## 2018-09-06 RX ORDER — HYDROXYZINE HYDROCHLORIDE 25 MG/1
25 TABLET, FILM COATED ORAL 3 TIMES DAILY PRN
Qty: 30 TAB | Refills: 0 | Status: SHIPPED | OUTPATIENT
Start: 2018-09-06 | End: 2019-05-28

## 2018-09-06 RX ORDER — FAMOTIDINE 20 MG/1
20 TABLET, FILM COATED ORAL 2 TIMES DAILY
COMMUNITY
End: 2019-05-28

## 2018-09-06 ASSESSMENT — ENCOUNTER SYMPTOMS
CHILLS: 0
SPEECH CHANGE: 0
NAUSEA: 0
SENSORY CHANGE: 0
SENSORY CHANGE: 0
ABDOMINAL PAIN: 0
FOCAL WEAKNESS: 0
EYE DISCHARGE: 0
VOMITING: 0
EYE DISCHARGE: 0
FEVER: 0
FEVER: 0
EYE REDNESS: 0
NERVOUS/ANXIOUS: 1
SHORTNESS OF BREATH: 0
EYE REDNESS: 0
DIAPHORESIS: 0
MYALGIAS: 0
CHILLS: 0

## 2018-09-06 NOTE — PROGRESS NOTES
Subjective:      Kalyn Addison is a 37 y.o. female who presents with Rash (has spread to the legs and very upset and anxious)            HPI    Patient 37-year-old anxious female recently seen for foot rash felt to be  related to tinea pedis was placed on treatment steroids and antifungal medications, patient states that it is improving now comes in with a rash on her entire body very sparse very pruritic and painful. Patient denies any fever chills nausea vomiting or diarrhea.    Patient states the rash that extends over her abdomen and medial thighs as well as her extremities is extremely painful pruritic sparsely distributed. She denies any exposure to insects pituitary scabies no history of change in her deodorants detergents or soaps. She uses Dove soap as well as, hypoallergenic laundry detergent.        PMH:  has a past medical history of GERD (gastroesophageal reflux disease).  MEDS:   Current Outpatient Prescriptions:   •  famotidine (PEPCID) 20 MG Tab, Take 20 mg by mouth 2 times a day., Disp: , Rfl:   •  triamcinolone acetonide (KENALOG) 0.1 % Ointment, Apply thin layer to affected area twice daily as needed, Disp: 1 Tube, Rfl: 1  •  terbinafine (LAMISIL) 1 % cream, Apply to affected area twice daily, Disp: 1 Tube, Rfl: 0  •  CRYSELLE-28 0.3-30 MG-MCG Tab, TAKE 1 TAB BY MOUTH EVERY DAY., Disp: 84 Tab, Rfl: 3  •  RaNITidine HCl (ZANTAC PO), Take  by mouth., Disp: , Rfl:   •  clindamycin (CLEOCIN) 150 MG Cap, Take 1 Cap by mouth 3 times a day., Disp: 30 Cap, Rfl: 0  ALLERGIES:   Allergies   Allergen Reactions   • Food      Rye  Causes swelling in the throat   • Amoxicillin Vomiting   • Clindamycin Rash     Rash     • Codeine Rash   • Septra [Bactrim] Rash     SURGHX: No past surgical history on file.  SOCHX:  reports that she has been smoking Cigarettes.  She has been smoking about 0.50 packs per day. She has never used smokeless tobacco. She reports that she drinks alcohol. She reports that she does not use  "drugs.  FH: family history includes Alcohol/Drug in her sister; Cancer in her maternal aunt.  Review of Systems   Constitutional: Negative for chills, diaphoresis, fever and malaise/fatigue.   HENT: Negative for congestion.    Eyes: Negative for discharge and redness.   Cardiovascular: Negative for chest pain.   Gastrointestinal: Negative for abdominal pain, nausea and vomiting.   Skin: Positive for itching and rash.        Sparsely distributed 1-2 mm slightly erythematous skin lesions uniformly distributed over her torso or extremities.   Neurological: Negative for sensory change and speech change.   Psychiatric/Behavioral: The patient is nervous/anxious.           Objective:     /72   Pulse 74   Temp 36.8 °C (98.2 °F)   Resp 18   Ht 1.702 m (5' 7\")   Wt 83.9 kg (185 lb)   SpO2 96%   BMI 28.98 kg/m²      Physical Exam   Constitutional: She appears well-developed and well-nourished. She appears distressed.   HENT:   Head: Normocephalic and atraumatic.   Left Ear: External ear normal.   Eyes: Right eye exhibits discharge. Left eye exhibits no discharge.   Neck: Normal range of motion.   Cardiovascular: Normal rate.    Pulmonary/Chest: Effort normal and breath sounds normal.   Musculoskeletal: Normal range of motion. She exhibits no edema or tenderness.   Skin: Skin is warm and dry. She is not diaphoretic. No erythema.   1-2 mm erythematous lesions sparsely distributed over her entire body were concentrated over her lower abdomen medial thighs. She states that they're both painful and seemed extremely pruritic.-   Psychiatric: She has a normal mood and affect.               Assessment/Plan:     Diagnosis: Pruritic rash                       Anxiety.    Patient was given Atarax used in place of Benadryl given referral to dermatology I recommended she consider going to [facility] because of the lesser week time. Weight ×24-6 months. Patient will follow-up with her PCP. Patient was given a note for work " today.

## 2018-09-06 NOTE — PROGRESS NOTES
Subjective:      Kalyn Addison is a 37 y.o. female who presents with Rash (rash on nipples x 3 weeks/ rash on bottoms of feet,x 2months/spider bite R calf x 1day)            Patient with months of skin problems.  She has what she suspects is athlete's foot and has not responded to over-the-counter medications.  Rash is itching and scaling.  She also has itching rash on bilateral nipples.  She does have a new bra however the rash was there before.  2 days of suspected insect bite right calf.  No obvious puncture wound.  No drainage.  No fever.  No leg swelling.        Review of Systems   Constitutional: Negative for chills and fever.   Eyes: Negative for discharge and redness.   Respiratory: Negative for shortness of breath.    Musculoskeletal: Negative for joint pain and myalgias.   Skin: Negative for itching.        No scalp involvement.   Neurological: Negative for sensory change and focal weakness.     .  Medications, Allergies, and current problem list reviewed today in Epic  No past medical history chronic dermatitis.     Objective:     /68   Pulse 75   Temp 36.9 °C (98.4 °F)   Resp 16   Wt 83.9 kg (185 lb)   SpO2 98%   BMI 28.98 kg/m²      Physical Exam   Constitutional: She appears well-developed and well-nourished. No distress.   HENT:   Head: Normocephalic and atraumatic.   Right Ear: External ear normal.   Cardiovascular: Normal rate.    Skin: Skin is warm and dry.   Pruritic scaling plaques on bilateral feet as well as bilateral nipples.  No evidence of secondary bacterial infection.    Red papule right posterior calf consistent with insect bite.  No fluctuance.  No drainage.               Assessment/Plan:     1. Tinea pedis of both feet    - terbinafine (LAMISIL) 1 % cream; Apply to affected area twice daily  Dispense: 1 Tube; Refill: 0    2. Dermatitis    - triamcinolone acetonide (KENALOG) 0.1 % Ointment; Apply thin layer to affected area twice daily as needed  Dispense: 1 Tube; Refill:  1    Differential diagnosis, natural history, supportive care, and indications for immediate follow-up discussed at length.     She may try lanolin on her nipples.

## 2018-09-06 NOTE — LETTER
September 6, 2018        Kalyn Addison  [address] [facility] [address]        Dear Kalyn:    Please ask for today off from work for medical reasons     If you have any questions or concerns, please don't hesitate to call.        Sincerely,        Maco Hand M.D.    Electronically Signed

## 2018-12-03 DIAGNOSIS — Z30.41 ENCOUNTER FOR SURVEILLANCE OF CONTRACEPTIVE PILLS: ICD-10-CM

## 2019-03-06 ENCOUNTER — OFFICE VISIT (OUTPATIENT)
Dept: URGENT CARE | Facility: PHYSICIAN GROUP | Age: 39
End: 2019-03-06
Payer: COMMERCIAL

## 2019-03-06 VITALS
OXYGEN SATURATION: 95 % | RESPIRATION RATE: 16 BRPM | HEART RATE: 97 BPM | DIASTOLIC BLOOD PRESSURE: 70 MMHG | SYSTOLIC BLOOD PRESSURE: 112 MMHG | BODY MASS INDEX: 29.29 KG/M2 | TEMPERATURE: 98 F | WEIGHT: 187 LBS

## 2019-03-06 DIAGNOSIS — T78.1XXA ALLERGIC REACTION TO FOOD, INITIAL ENCOUNTER: ICD-10-CM

## 2019-03-06 PROCEDURE — 99213 OFFICE O/P EST LOW 20 MIN: CPT | Performed by: NURSE PRACTITIONER

## 2019-03-06 ASSESSMENT — ENCOUNTER SYMPTOMS
ORTHOPNEA: 0
SORE THROAT: 0
WEAKNESS: 0
MYALGIAS: 0
NAUSEA: 0
WHEEZING: 0
EYE REDNESS: 0
FEVER: 0
NECK PAIN: 0
DIZZINESS: 0
SHORTNESS OF BREATH: 1
SPEECH CHANGE: 0
HEADACHES: 0
PALPITATIONS: 0
EYE DISCHARGE: 0
VOMITING: 0
CHILLS: 0
ABDOMINAL PAIN: 0
NERVOUS/ANXIOUS: 1
COUGH: 0
FOCAL WEAKNESS: 0
TINGLING: 0

## 2019-03-06 NOTE — PROGRESS NOTES
Subjective:      Kalyn Addison is a 38 y.o. female who presents with Allergic Reaction (Having a reaction to rye, Tongue swelling, anxiety attack, back of throat swollen, hoarse voice, hard to swallow)            HPI  States went to an [place] bar yesterday and may have had something with rye in it. States felt throat/tongue swell and admits to anxiety attack after this incident. Took 1 tab benadryl last night and went to bed. Awoke this morning and felt better but feels like her throat is swollen and has a hoarse voice. States anxiety improved as well as SOB. Denies CP/pressure, HA or inability to swallow. Requesting work note.    PMH:  has a past medical history of GERD (gastroesophageal reflux disease).  MEDS:   Current Outpatient Prescriptions:   •  norgestrel-ethinyl estradiol (CRYSELLE-28) 0.3-30 MG-MCG Tab, Take 1 Tab by mouth every day., Disp: 84 Tab, Rfl: 0  •  famotidine (PEPCID) 20 MG Tab, Take 20 mg by mouth 2 times a day., Disp: , Rfl:   •  hydrOXYzine HCl (ATARAX) 25 MG Tab, Take 1 Tab by mouth 3 times a day as needed for Itching. (Patient not taking: Reported on 3/6/2019), Disp: 30 Tab, Rfl: 0  •  RaNITidine HCl (ZANTAC PO), Take  by mouth., Disp: , Rfl:   •  triamcinolone acetonide (KENALOG) 0.1 % Ointment, Apply thin layer to affected area twice daily as needed (Patient not taking: Reported on 3/6/2019), Disp: 1 Tube, Rfl: 1  •  terbinafine (LAMISIL) 1 % cream, Apply to affected area twice daily (Patient not taking: Reported on 3/6/2019), Disp: 1 Tube, Rfl: 0  •  clindamycin (CLEOCIN) 150 MG Cap, Take 1 Cap by mouth 3 times a day. (Patient not taking: Reported on 3/6/2019), Disp: 30 Cap, Rfl: 0  ALLERGIES:   Allergies   Allergen Reactions   • Food      Rye  Causes swelling in the throat   • Amoxicillin Vomiting   • Clindamycin Rash     Rash     • Codeine Rash   • Septra [Bactrim] Rash     SURGHX: History reviewed. No pertinent surgical history.  SOCHX:  reports that she has been smoking Cigarettes.   She has been smoking about 0.50 packs per day. She has never used smokeless tobacco. She reports that she drinks alcohol. She reports that she does not use drugs.  FH: Family history was reviewed, no pertinent findings to report    Review of Systems   Constitutional: Negative for chills, fever and malaise/fatigue.   HENT: Negative for congestion, ear pain and sore throat.    Eyes: Negative for discharge and redness.   Respiratory: Positive for shortness of breath. Negative for cough and wheezing.    Cardiovascular: Negative for chest pain, palpitations and orthopnea.   Gastrointestinal: Negative for abdominal pain, nausea and vomiting.   Musculoskeletal: Negative for myalgias and neck pain.   Skin: Negative for itching and rash.   Neurological: Negative for dizziness, tingling, speech change, focal weakness, weakness and headaches.   Endo/Heme/Allergies: Positive for environmental allergies.   Psychiatric/Behavioral: The patient is nervous/anxious.    All other systems reviewed and are negative.         Objective:     /70 (BP Location: Right arm, Patient Position: Sitting, BP Cuff Size: Adult)   Pulse 97   Temp 36.7 °C (98 °F) (Temporal)   Resp 16   Wt 84.8 kg (187 lb)   LMP 02/27/2019   SpO2 95%   BMI 29.29 kg/m²      Physical Exam   Constitutional: She is oriented to person, place, and time. Vital signs are normal. She appears well-developed and well-nourished. She is active and cooperative.  Non-toxic appearance. She does not have a sickly appearance. She does not appear ill. No distress.   Able to speak full sentences without difficulty.   HENT:   Head: Normocephalic.   Mouth/Throat: Uvula is midline, oropharynx is clear and moist and mucous membranes are normal. No uvula swelling. No posterior oropharyngeal edema or posterior oropharyngeal erythema. Tonsils are 1+ on the right. Tonsils are 1+ on the left. No tonsillar exudate.   Eyes: Pupils are equal, round, and reactive to light. Conjunctivae and  "EOM are normal.   Neck: Normal range of motion. Neck supple.   Cardiovascular: Normal rate and regular rhythm.    Pulmonary/Chest: Effort normal and breath sounds normal. No bradypnea. No respiratory distress. She has no decreased breath sounds. She has no wheezes. She has no rhonchi. She has no rales.   Musculoskeletal: Normal range of motion.   Neurological: She is alert and oriented to person, place, and time. She has normal strength. She is not disoriented. No cranial nerve deficit or sensory deficit. Coordination and gait normal. GCS eye subscore is 4. GCS verbal subscore is 5. GCS motor subscore is 6.   Skin: Skin is warm and dry. She is not diaphoretic.   Psychiatric: She has a normal mood and affect. Her speech is normal and behavior is normal. Judgment and thought content normal. She is not actively hallucinating. Cognition and memory are normal.   Appears worried about her symptoms. She is attentive.   Vitals reviewed.            Declines steroid injection, states had \"severe bad reaction\" to this  Assessment/Plan:     1. Allergic reaction to food, initial encounter    May use Zyrtec or other longer acting antihistamine daily x 1 week  May use salt water gargle for throat discomfort.  May use throat lozenges, throat spray or candy to lubricate mouth/throat mucosa  Monitor for inability to swallow/speak or difficulty breathing, increased anxiety, SOB, CP/chest tightness- must call 911 immediately  Work note provided      "

## 2019-03-06 NOTE — LETTER
March 6, 2019       Patient: Kalyn Addison   YOB: 1980   Date of Visit: 3/6/2019         To Whom It May Concern:    It is my medical opinion that Kalyn Addison be excused from work due to illness. May return 3/8/19.    If you have any questions or concerns, please don't hesitate to call [phone]          Sincerely,          SONIA GiraldoN.P.  Electronically Signed

## 2019-03-26 ENCOUNTER — OFFICE VISIT (OUTPATIENT)
Dept: URGENT CARE | Facility: PHYSICIAN GROUP | Age: 39
End: 2019-03-26
Payer: COMMERCIAL

## 2019-03-26 ENCOUNTER — OCCUPATIONAL MEDICINE (OUTPATIENT)
Dept: URGENT CARE | Facility: PHYSICIAN GROUP | Age: 39
End: 2019-03-26
Payer: COMMERCIAL

## 2019-03-26 VITALS
TEMPERATURE: 98.6 F | SYSTOLIC BLOOD PRESSURE: 108 MMHG | OXYGEN SATURATION: 96 % | HEART RATE: 60 BPM | DIASTOLIC BLOOD PRESSURE: 60 MMHG | BODY MASS INDEX: 29.35 KG/M2 | WEIGHT: 187 LBS | HEIGHT: 67 IN

## 2019-03-26 DIAGNOSIS — S60.512A ABRASION OF LEFT HAND, INITIAL ENCOUNTER: ICD-10-CM

## 2019-03-26 DIAGNOSIS — S60.419A ABRASION OF FINGER OF LEFT HAND, INITIAL ENCOUNTER: ICD-10-CM

## 2019-03-26 PROCEDURE — 99213 OFFICE O/P EST LOW 20 MIN: CPT | Mod: 29 | Performed by: PHYSICIAN ASSISTANT

## 2019-03-26 ASSESSMENT — ENCOUNTER SYMPTOMS
CHILLS: 0
FEVER: 0
NAUSEA: 0
MUSCULOSKELETAL NEGATIVE: 1
DIZZINESS: 0
TINGLING: 0
SORE THROAT: 0
SHORTNESS OF BREATH: 0
ROS SKIN COMMENTS: + ABRASION
DIARRHEA: 0
ABDOMINAL PAIN: 0
VOMITING: 0

## 2019-03-26 NOTE — LETTER
"EMPLOYEE’S CLAIM FOR COMPENSATION/ REPORT OF INITIAL TREATMENT  FORM C-4    EMPLOYEE’S CLAIM - PROVIDE ALL INFORMATION REQUESTED   First Name  Kalyn Last Name  Azael Birthdate                    1980                Sex  female Claim Number   Home Address  [address] Age  38 y.o. Height  5'7\" Weight  187 lbs N     [place] Zip  [address] Telephone  [phone] (work)   Mailing Address  [address] [place]  [address] Primary Language Spoken  English    Insurer   Third Party   [employer]   Employee's Occupation (Job Title) When Injury or Occupational Disease Occurred       Employer's Name    [employer] Telephone   [phone]   Employer Address   [address] [place]   [address]   Date of Injury  3/26/2019               Hour of Injury  8:30 AM Date Employer Notified  3/26/2019 Last Day of Work after Injury or Occupational Disease  3/26/2019 Supervisor to Whom Injury Reported  Anthony Ken    Address or Location of Accident (if applicable)  [[facility] ]   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 (Be specific an answer in detail. Use additional sheet if necessary)  While moving cases of cans - my hand fell on another case of cans and cut my finger    If you believe that you have an occupational disease, when did you first have knowledge of the disability and it relationship to your employment?  N/A Witnesses to the Accident  None      Nature of Injury or Occupational Disease  Laceration  Part(s) of Body Injured or Affected  Hand (L), Finger (L), N/A    I certify that the above is true and correct to the best of my knowledge and that I have provided this information in order to obtain the benefits of [place]’s " [place]  I hereby authorize any physician, chiropractor, surgeon, practitioner, or other person, any hospital, including [facility] or [facility], any medical service organization, any insurance company, or other institution or organization to release to each other, any medical or other information, including benefits paid or payable, pertinent to this injury or disease, except information relative to diagnosis, treatment and/or counseling for AIDS, psychological conditions, alcohol or controlled substances, for which I must give specific authorization.  A Photostat of this authorization shall be as valid as the original.     Date   Place   Employee’s Signature   THIS REPORT MUST BE COMPLETED AND MAILED WITHIN 3 WORKING DAYS OF TREATMENT   Place  [facility]  Name of Facility  [facility]   Date  3/26/2019 Diagnosis  (S60.419A) Abrasion of finger of left hand, initial encounter Is there evidence the injured employee was under the influence of alcohol and/or another controlled substance at the time of accident?   Hour   Description of Injury or Disease  The encounter diagnosis was Abrasion of finger of left hand, initial encounter. No   Treatment  Area cleaned, polysporin applied, and dressing placed.   Advised to keep covered full time while at work.     Tetanus booster is UTD   Patient discharged/MMI today, encouraged to monitor area closely and RTC if signs of infection develop   Have you advised the patient to remain off work five days or more? No   X-Ray Findings      If Yes   From Date  To Date      From information given by the employee, together with medical evidence, can you directly connect this injury or occupational disease as job incurred?  Yes If No Full Duty  Yes Modified Duty      Is additional medical care by a physician indicated?  No If Modified Duty, Specify any  "Limitations / Restrictions      Do you know of any previous injury or disease contributing to this condition or occupational disease?                            No   Date  3/26/2019 Print Doctor’s Name Estephania Pelayo P.A.-C. I certify the employer’s copy of  this form was mailed on:   Address  [address] [place] [address]. [address] Insurer’s Use Only     [facility] Zip  [address]    Provider’s Tax ID Number  [id number] Telephone  Dept: [phone]        e-ESTEPHANIA Noland P.A.-C.   e-Signature: Dr. Zeus De León, Medical Director Degree  THOMAS        ORIGINAL-TREATING PHYSICIAN OR CHIROPRACTOR    PAGE 2-INSURER/TPA    PAGE 3-EMPLOYER    PAGE 4-EMPLOYEE             Form C-4 (rev10/07)              BRIEF DESCRIPTION OF RIGHTS AND BENEFITS  (Pursuant to NRS 616C.050)    Notice of Injury or Occupational Disease (Incident Report Form C-1): If an injury or occupational disease (OD) arises out of and in the  course of employment, you must provide written notice to your employer as soon as practicable, but no later than 7 days after the accident or  OD. Your employer shall maintain a sufficient supply of the required forms.    Claim for Compensation (Form C-4): If medical treatment is sought, the form C-4 is available at the place of initial treatment. A completed  \"Claim for Compensation\" (Form C-4) must be filed within 90 days after an accident or OD. The treating physician or chiropractor must,  within 3 working days after treatment, complete and mail to the employer, the employer's insurer and third-party , the Claim for  Compensation.    Medical Treatment: If you require medical treatment for your on-the-job injury or OD, you may be required to select a physician or  chiropractor from a list provided by your workers’ compensation insurer, if it has contracted with an Organization for Managed Care (MCO) or  Preferred Provider Organization (PPO) or providers of health care. If your employer has not " entered into a contract with an MCO or PPO, you  may select a physician or chiropractor from the Panel of Physicians and Chiropractors. Any medical costs related to your industrial injury or  OD will be paid by your insurer.    Temporary Total Disability (TTD): If your doctor has certified that you are unable to work for a period of at least 5 consecutive days, or 5  cumulative days in a 20-day period, or places restrictions on you that your employer does not accommodate, you may be entitled to TTD  compensation.    Temporary Partial Disability (TPD): If the wage you receive upon reemployment is less than the compensation for TTD to which you are  entitled, the insurer may be required to pay you TPD compensation to make up the difference. TPD can only be paid for a maximum of 24  months.    Permanent Partial Disability (PPD): When your medical condition is stable and there is an indication of a PPD as a result of your injury or  OD, within 30 days, your insurer must arrange for an evaluation by a rating physician or chiropractor to determine the degree of your PPD. The  amount of your PPD award depends on the date of injury, the results of the PPD evaluation and your age and wage.    Permanent Total Disability (PTD): If you are medically certified by a treating physician or chiropractor as permanently and totally disabled  and have been granted a PTD status by your insurer, you are entitled to receive monthly benefits not to exceed 66 2/3% of your average  monthly wage. The amount of your PTD payments is subject to reduction if you previously received a PPD award.    Vocational Rehabilitation Services: You may be eligible for vocational rehabilitation services if you are unable to return to the job due to a  permanent physical impairment or permanent restrictions as a result of your injury or occupational disease.    Transportation and Per Sofy Reimbursement: You may be eligible for travel expenses and per sofy  associated with medical treatment.    Reopening: You may be able to reopen your claim if your condition worsens after claim closure.    Appeal Process: If you disagree with a written determination issued by the insurer or the insurer does not respond to your request, you may  appeal to the Department of Administration, , by following the instructions contained in your determination letter. You must  appeal the determination within 70 days from the date of the determination letter at [address], [address], [place], [place]  [address], or [address], [address], [place], [place] [address]. If you disagree with the  decision, you may appeal to the  Department of Administration, . You must file your appeal within 30 days from the date of the  decision  letter at [address], [address], [place], [place] [address], or [address], [place], [place] [address]. If you  disagree with a decision of an , you may file a petition for judicial review with the District Court. You must do so within 30  days of the Appeal Officer’s decision. You may be represented by an  at your own expense or you may contact the [facility] for possible  representation.    [place]  for Injured Workers (NAIW): If you disagree with a  decision, you may request that NAIW represent you  without charge at an  Hearing. For information regarding denial of benefits, you may contact the [facility] at: [address], [address], [place] [address], [phone], or [address], [address], [place] [address], [phone]    To File a Complaint with the Division: If you wish to file a complaint with the  of the Division of Industrial Relations (DIR),  please contact the Workers’ Compensation Section, [address], [address], [place], [place] [address], telephone  ([phone]) [phone], or  [address], [address], [place], [place] [address], telephone [phone].    For assistance with Workers’ Compensation Issues: you may contact the Office of the Governor Consumer Health Assistance, [address] [place] [address], [address], [place], [place] [address], Toll Free 1-860.100.9352, Web site: http://govcha.[facility].nv., E-mail  [email or web address].                                                                                                                                                                                                                                   __________________________________________________________________                                                                   _________________                Employee Name / Signature                                                                                                                                                       Date                                                                                                                                                                                                     D-2 (rev. 10/07)

## 2019-03-26 NOTE — LETTER
[facility]  [address] [place] [address]. [address] - [place] [address]  Phone:  [phone] - Fax:  [phone]   [facility] Progress Report and Disability Certification  Date of Service: 3/26/2019   No Show:  No  Date / Time of Next Visit:     Claim Information   Patient Name: Kalyn Addison  Claim Number:     Employer:   [employer] Date of Injury: 3/26/2019     Insurer / TPA: [employer]  ID / SSN:     Occupation:    Diagnosis: The encounter diagnosis was Abrasion of finger of left hand, initial encounter.    Medical Information   Related to Industrial Injury? Yes    Subjective Complaints:  DOI: 3/26/19. Patient presents to urgent care reporting a cut on her left ring finger that happened about 1 hour PTA. No significant bleeding to the site. She is right hand dominant. No distal numbness/tingling. Tetanus booster is UTD (2018).    Objective Findings: Skin: Skin is warm and dry. She is not diaphoretic.   Very superficial, linear abrasion present on distal aspect of left ring finger, measuring approximately 0.5 cm in total length. No active bleeding.    Pre-Existing Condition(s): None   Assessment:   Initial Visit    Status: Discharged /  MMI  Permanent Disability:No    Plan:      Diagnostics:      Comments:       Disability Information   Status: Released to Full Duty    From:     Through:   Restrictions are:     Physical Restrictions   Sitting:    Standing:    Stooping:    Bending:      Squatting:    Walking:    Climbing:    Pushing:      Pulling:    Other:    Reaching Above Shoulder (L):   Reaching Above Shoulder (R):       Reaching Below Shoulder (L):    Reaching Below Shoulder (R):      Not to exceed Weight Limits   Carrying(hrs):   Weight Limit(lb):   Lifting(hrs):   Weight  Limit(lb):     Comments: Area cleaned, polysporin applied, and dressing placed.  Advised to keep covered full time while at work.    Tetanus booster is UTD  Patient  discharged/MMI today, encouraged to monitor area closely and RTC if signs of infection develop    Repetitive Actions   Hands: i.e. Fine Manipulations from Grasping:     Feet: i.e. Operating Foot Controls:     Driving / Operate Machinery:     Physician Name: Estephania ePlayo P.A.-C. Physician Signature: ESTEPHANIA Blanco P.A.-C. e-Signature: Dr. Zeus De León, Medical Director   Clinic Name / Location: [address]  [place] [address] Clinic Phone Number: Dept: [phone]   Appointment Time: 10:05 Am Visit Start Time:    Check-In Time:  10:07 Am Visit Discharge Time: 10:30AM   Original-Treating Physician or Chiropractor    Page 2-Insurer/TPA    Page 3-Employer    Page 4-Employee

## 2019-03-26 NOTE — PROGRESS NOTES
Subjective:      Kalyn Addison is a 38 y.o. female who presents with No chief complaint on file.      DOI: 3/26/19. Patient presents to urgent care reporting a cut on her left ring finger that happened about 1 hour PTA. No significant bleeding to the site. She is right hand dominant. No distal numbness/tingling. Tetanus booster is UTD (2018).      HPI    Review of Systems   Constitutional: Negative for chills and fever.   HENT: Negative for congestion and sore throat.    Respiratory: Negative for shortness of breath.    Cardiovascular: Negative for chest pain.   Gastrointestinal: Negative for abdominal pain, diarrhea, nausea and vomiting.   Genitourinary: Negative.    Musculoskeletal: Negative.    Skin:        + abrasion   Neurological: Negative for dizziness and tingling.        Objective:     LMP 02/27/2019      Physical Exam   Constitutional: She is oriented to person, place, and time. She appears well-developed and well-nourished. No distress.   HENT:   Head: Normocephalic and atraumatic.   Eyes: Pupils are equal, round, and reactive to light.   Neck: Normal range of motion.   Cardiovascular: Normal rate.    Pulmonary/Chest: Effort normal.   Musculoskeletal: Normal range of motion.   Neurological: She is alert and oriented to person, place, and time.   Skin: Skin is warm and dry. She is not diaphoretic.   Very superficial, linear abrasion present on distal aspect of left ring finger.    Psychiatric: She has a normal mood and affect. Her behavior is normal.   Nursing note and vitals reviewed.       PMH:  has a past medical history of GERD (gastroesophageal reflux disease).  MEDS:   Current Outpatient Prescriptions:   •  norgestrel-ethinyl estradiol (CRYSELLE-28) 0.3-30 MG-MCG Tab, Take 1 Tab by mouth every day., Disp: 84 Tab, Rfl: 0  •  famotidine (PEPCID) 20 MG Tab, Take 20 mg by mouth 2 times a day., Disp: , Rfl:   •  hydrOXYzine HCl (ATARAX) 25 MG Tab, Take 1 Tab by mouth 3 times a day as needed for Itching.  "(Patient not taking: Reported on 3/6/2019), Disp: 30 Tab, Rfl: 0  •  RaNITidine HCl (ZANTAC PO), Take  by mouth., Disp: , Rfl:   •  triamcinolone acetonide (KENALOG) 0.1 % Ointment, Apply thin layer to affected area twice daily as needed (Patient not taking: Reported on 3/6/2019), Disp: 1 Tube, Rfl: 1  •  terbinafine (LAMISIL) 1 % cream, Apply to affected area twice daily (Patient not taking: Reported on 3/6/2019), Disp: 1 Tube, Rfl: 0  •  clindamycin (CLEOCIN) 150 MG Cap, Take 1 Cap by mouth 3 times a day. (Patient not taking: Reported on 3/6/2019), Disp: 30 Cap, Rfl: 0  ALLERGIES:   Allergies   Allergen Reactions   • Food      Rye  Causes swelling in the throat   • Amoxicillin Vomiting   • Clindamycin Rash     Rash     • Codeine Rash   • Prednisone      All steroids, \"bad reaction\"   • Septra [Bactrim] Rash     SURGHX: No past surgical history on file.  SOCHX:  reports that she has been smoking Cigarettes.  She has been smoking about 0.50 packs per day. She has never used smokeless tobacco. She reports that she drinks alcohol. She reports that she does not use drugs.  FH: family history includes Alcohol/Drug in her sister; Cancer in her maternal aunt.       Assessment/Plan:     1. Abrasion of finger of left hand, initial encounter    Area cleaned, polysporin applied, and dressing placed.  Advised to keep covered full time while at work.    Tetanus booster is UTD  Patient discharged/MMI today, encouraged to monitor area closely and RTC if signs of infection develop  "

## 2019-05-28 ENCOUNTER — OFFICE VISIT (OUTPATIENT)
Dept: URGENT CARE | Facility: PHYSICIAN GROUP | Age: 39
End: 2019-05-28
Payer: COMMERCIAL

## 2019-05-28 VITALS
HEIGHT: 67 IN | OXYGEN SATURATION: 96 % | WEIGHT: 180 LBS | HEART RATE: 86 BPM | BODY MASS INDEX: 28.25 KG/M2 | RESPIRATION RATE: 18 BRPM | SYSTOLIC BLOOD PRESSURE: 132 MMHG | DIASTOLIC BLOOD PRESSURE: 76 MMHG | TEMPERATURE: 98.4 F

## 2019-05-28 DIAGNOSIS — R11.0 NAUSEA: ICD-10-CM

## 2019-05-28 DIAGNOSIS — S39.012A ACUTE MYOFASCIAL STRAIN OF LUMBOSACRAL REGION, INITIAL ENCOUNTER: Primary | ICD-10-CM

## 2019-05-28 LAB
APPEARANCE UR: CLEAR
BILIRUB UR STRIP-MCNC: NORMAL MG/DL
COLOR UR AUTO: NORMAL
GLUCOSE UR STRIP.AUTO-MCNC: NORMAL MG/DL
KETONES UR STRIP.AUTO-MCNC: 40 MG/DL
LEUKOCYTE ESTERASE UR QL STRIP.AUTO: NORMAL
NITRITE UR QL STRIP.AUTO: NORMAL
PH UR STRIP.AUTO: 7 [PH] (ref 5–8)
PROT UR QL STRIP: NORMAL MG/DL
RBC UR QL AUTO: NORMAL
SP GR UR STRIP.AUTO: 1.02
UROBILINOGEN UR STRIP-MCNC: 1 MG/DL

## 2019-05-28 PROCEDURE — 81002 URINALYSIS NONAUTO W/O SCOPE: CPT | Performed by: PHYSICIAN ASSISTANT

## 2019-05-28 PROCEDURE — 99214 OFFICE O/P EST MOD 30 MIN: CPT | Performed by: PHYSICIAN ASSISTANT

## 2019-05-28 RX ORDER — KETOROLAC TROMETHAMINE 30 MG/ML
60 INJECTION, SOLUTION INTRAMUSCULAR; INTRAVENOUS ONCE
Status: COMPLETED | OUTPATIENT
Start: 2019-05-28 | End: 2019-05-28

## 2019-05-28 RX ORDER — ONDANSETRON 4 MG/1
4 TABLET, ORALLY DISINTEGRATING ORAL EVERY 8 HOURS PRN
Qty: 10 TAB | Refills: 0 | Status: SHIPPED | OUTPATIENT
Start: 2019-05-28 | End: 2019-05-31

## 2019-05-28 RX ORDER — TRAMADOL HYDROCHLORIDE 50 MG/1
50-100 TABLET ORAL EVERY 4 HOURS PRN
Qty: 30 TAB | Refills: 0 | Status: SHIPPED | OUTPATIENT
Start: 2019-05-28 | End: 2019-06-02

## 2019-05-28 RX ORDER — METHOCARBAMOL 750 MG/1
750 TABLET, FILM COATED ORAL 4 TIMES DAILY
Qty: 40 TAB | Refills: 0 | Status: SHIPPED | OUTPATIENT
Start: 2019-05-28 | End: 2019-06-07

## 2019-05-28 RX ADMIN — KETOROLAC TROMETHAMINE 60 MG: 30 INJECTION, SOLUTION INTRAMUSCULAR; INTRAVENOUS at 16:07

## 2019-05-28 NOTE — PROGRESS NOTES
"Subjective:      Pt is a 38 y.o. female who presents with Back Pain (back pain x1 day, nausea)            HPI  This is a new problem. Pt notes one day of intense lower back pain with associated nausea without known trauma or injury but occurred after bowling and hiking one day. Pt has not taken any Rx medications for this condition. Pt states the pain is a 7-8/10, aching in nature and worse at night. Pt denies CP, SOB,  paresthesias, headaches, dizziness, change in vision, hives, or other joint pain. The pt's medication list, problem list, and allergies have been evaluated and reviewed during today's visit.    PMH:  Past Medical History:   Diagnosis Date   • GERD (gastroesophageal reflux disease)        PSH:  Negative per pt.      Fam Hx:    family history includes Alcohol/Drug in her sister; Cancer in her maternal aunt.  Family Status   Relation Status   • Mo Alive   • Fa Alive   • Sis Alive   • Bro Alive   • Bro Alive   • Bro Alive   • Sis Alive   • MAunt (Not Specified)   • Neg Hx (Not Specified)       Soc HX:  Social History     Social History   • Marital status:      Spouse name: N/A   • Number of children: N/A   • Years of education: N/A     Occupational History   • Not on file.     Social History Main Topics   • Smoking status: Current Every Day Smoker     Packs/day: 0.50     Types: Cigarettes   • Smokeless tobacco: Never Used      Comment: has \"cut down\"   • Alcohol use 0.0 oz/week      Comment: 2 drinks weekly   • Drug use: No   • Sexual activity: Yes     Partners: Male     Birth control/ protection: Pill     Other Topics Concern   • Not on file     Social History Narrative   • No narrative on file         Medications:    Current Outpatient Prescriptions:   •  tramadol (ULTRAM) 50 MG Tab, Take 1-2 Tabs by mouth every four hours as needed for up to 5 days., Disp: 30 Tab, Rfl: 0  •  methocarbamol (ROBAXIN) 750 MG Tab, Take 1 Tab by mouth 4 times a day for 10 days., Disp: 40 Tab, Rfl: 0  •  ondansetron " "(ZOFRAN ODT) 4 MG TABLET DISPERSIBLE, Take 1 Tab by mouth every 8 hours as needed for up to 3 days., Disp: 10 Tab, Rfl: 0    Current Facility-Administered Medications:   •  ketorolac (TORADOL) injection 60 mg, 60 mg, Intramuscular, Once, Zackery Barker P.A.-C.      Allergies:  Food; Amoxicillin; Clindamycin; Codeine; Prednisone; and Septra [bactrim]    ROS   Constitutional: Negative for fever, chills and malaise/fatigue.   HENT: Negative for congestion and sore throat.    Eyes: Negative for blurred vision, double vision and photophobia.   Respiratory: Negative for cough and shortness of breath.  Cardiovascular: Negative for chest pain and palpitations.   Gastrointestinal: POS nausea, NEG vomiting, abdominal pain, diarrhea and constipation.   Genitourinary: Negative for dysuria and flank pain.   Musculoskeletal: POS for lumbar joint pain and myalgias.   Skin: Negative for itching and rash.   Neurological: Negative for dizziness, tingling and headaches.   Endo/Heme/Allergies: Does not bruise/bleed easily.   Psychiatric/Behavioral: Negative for depression. The patient is not nervous/anxious.             Objective:     /76 (BP Location: Left arm, Patient Position: Sitting, BP Cuff Size: Small adult)   Pulse 86   Temp 36.9 °C (98.4 °F) (Temporal)   Resp 18   Ht 1.702 m (5' 7\")   Wt 81.6 kg (180 lb)   SpO2 96%   BMI 28.19 kg/m²      Physical Exam   Musculoskeletal:        Lumbar back: She exhibits decreased range of motion, tenderness, pain and spasm. She exhibits no bony tenderness, no swelling, no edema, no deformity, no laceration and normal pulse.        Back:           Constitutional: PT is oriented to person, place, and time. PT appears well-developed and well-nourished. No distress.   HENT:   Head: Normocephalic and atraumatic.   Mouth/Throat: Oropharynx is clear and moist. No oropharyngeal exudate.   Eyes: Conjunctivae normal and EOM are normal. Pupils are equal, round, and reactive to light. "   Neck: Normal range of motion. Neck supple. No thyromegaly present.   Cardiovascular: Normal rate, regular rhythm, normal heart sounds and intact distal pulses.  Exam reveals no gallop and no friction rub.    No murmur heard.  Pulmonary/Chest: Effort normal and breath sounds normal. No respiratory distress. PT has no wheezes. PT has no rales. Pt exhibits no tenderness.   Abdominal: Soft. Bowel sounds are normal. PT exhibits no distension and no mass. There is no tenderness. There is no rebound and no guarding.   Neurological: PT is alert and oriented to person, place, and time. PT has normal reflexes. No cranial nerve deficit.   Skin: Skin is warm and dry. No rash noted. PT is not diaphoretic. No erythema.       Psychiatric: PT has a normal mood and affect. PT behavior is normal. Judgment and thought content normal.        Assessment/Plan:     1. Acute myofascial strain of lumbosacral region, initial encounter    - POCT Urinalysis-->WNL  - ketorolac (TORADOL) injection 60 mg; 2 mL by Intramuscular route Once.  - tramadol (ULTRAM) 50 MG Tab; Take 1-2 Tabs by mouth every four hours as needed for up to 5 days.  Dispense: 30 Tab; Refill: 0  - methocarbamol (ROBAXIN) 750 MG Tab; Take 1 Tab by mouth 4 times a day for 10 days.  Dispense: 40 Tab; Refill: 0  - Consent for Opiate Prescription    2. Nausea    - ondansetron (ZOFRAN ODT) 4 MG TABLET DISPERSIBLE; Take 1 Tab by mouth every 8 hours as needed for up to 3 days.  Dispense: 10 Tab; Refill: 0    [facility] Aware web site evaluation: I have obtained and reviewed patient utilization report from [facility] pharmacy database prior to writing prescription for controlled substance II, III or IV per [place] bill . Based on the report and my clinical assessment the prescription is medically necessary.   NSAIDs for pain 1-5, Ultram for pain 6-10 or to help get to sleep.  RICE therapy discussed  Gentle ROM exercises discussed  WBAT BLE  Ice/heat therapy discussed  Rest,  fluids encouraged.  AVS with medical info given.  Pt was in full understanding and agreement with the plan.  Follow-up as needed if symptoms worsen or fail to improve.

## 2019-05-28 NOTE — PATIENT INSTRUCTIONS
Lumbosacral Strain  Lumbosacral strain is an injury that causes pain in the lower back (lumbosacral spine). This injury usually occurs from overstretching the muscles or ligaments along your spine. A strain can affect one or more muscles or cord-like tissues that connect bones to other bones (ligaments).  What are the causes?  This condition may be caused by:  · A hard, direct hit (blow) to the back.  · Excessive stretching of the lower back muscles. This may result from:  ¨ A fall.  ¨ Lifting something heavy.  ¨ Repetitive movements such as bending or crouching.  What increases the risk?  The following factors may increase your risk of getting this condition:  · Participating in sports or activities that involve:  ¨ A sudden twist of the back.  ¨ Pushing or pulling motions.  · Being overweight or obese.  · Having poor strength and flexibility, especially tight hamstrings or weak muscles in the back or abdomen.  · Having too much of a curve in the lower back.  · Having a pelvis that is tilted forward.  What are the signs or symptoms?  The main symptom of this condition is pain in the lower back, at the site of the strain. Pain may extend (radiate) down one or both legs.  How is this diagnosed?  This condition is diagnosed based on:  · Your symptoms.  · Your medical history.  · A physical exam.  ¨ Your health care provider may push on certain areas of your back to determine the source of your pain.  ¨ You may be asked to bend forward, backward, and side to side to assess the severity of your pain and your range of motion.  · Imaging tests, such as:  ¨ X-rays.  ¨ MRI.  How is this treated?  Treatment for this condition may include:  · Putting heat and cold on the affected area.  · Medicines to help relieve pain and relax your muscles (muscle relaxants).  · NSAIDs to help reduce swelling and discomfort.  When your symptoms improve, it is important to gradually return to your normal routine as soon as possible to reduce  pain, avoid stiffness, and avoid loss of muscle strength. Generally, symptoms should improve within 6 weeks of treatment. However, recovery time varies.  Follow these instructions at home:  Managing pain, stiffness, and swelling  · If directed, put ice on the injured area during the first 24 hours after your strain.  ¨ Put ice in a plastic bag.  ¨ Place a towel between your skin and the bag.  ¨ Leave the ice on for 20 minutes, 2-3 times a day.  · If directed, put heat on the affected area as often as told by your health care provider. Use the heat source that your health care provider recommends, such as a moist heat pack or a heating pad.  ¨ Place a towel between your skin and the heat source.  ¨ Leave the heat on for 20-30 minutes.  ¨ Remove the heat if your skin turns bright red. This is especially important if you are unable to feel pain, heat, or cold. You may have a greater risk of getting burned.  Activity  · Rest and return to your normal activities as told by your health care provider. Ask your health care provider what activities are safe for you.  · Avoid activities that take a lot of energy for as long as told by your health care provider.  General instructions  · Take over-the-counter and prescription medicines only as told by your health care provider.  · Do not drive or use heavy machinery while taking prescription pain medicine.  · Do not use any products that contain nicotine or tobacco, such as cigarettes and e-cigarettes. If you need help quitting, ask your health care provider.  · Keep all follow-up visits as told by your health care provider. This is important.  How is this prevented?  · Use correct form when playing sports and lifting heavy objects.  · Use good posture when sitting and standing.  · Maintain a healthy weight.  · Sleep on a mattress with medium firmness to support your back.  · Be safe and responsible while being active to avoid falls.  · Do at least 150 minutes of  moderate-intensity exercise each week, such as brisk walking or water aerobics. Try a form of exercise that takes stress off your back, such as swimming or stationary cycling.  · Maintain physical fitness, including:  ¨ Strength.  ¨ Flexibility.  ¨ Cardiovascular fitness.  ¨ Endurance.  Contact a health care provider if:  · Your back pain does not improve after 6 weeks of treatment.  · Your symptoms get worse.  Get help right away if:  · Your back pain is severe.  · You cannot stand or walk.  · You have difficulty controlling when you urinate or when you have a bowel movement.  · You feel nauseous or you vomit.  · Your feet get very cold.  · You have numbness, tingling, weakness, or problems using your arms or legs.  · You develop any of the following:  ¨ Shortness of breath.  ¨ Dizziness.  ¨ Pain in your legs.  ¨ Weakness in your buttocks or legs.  ¨ Discoloration of the skin on your toes or legs.  This information is not intended to replace advice given to you by your health care provider. Make sure you discuss any questions you have with your health care provider.  Document Released: 09/27/2006 Document Revised: 07/07/2017 Document Reviewed: 05/21/2017  [email or web address] Interactive Patient Education © 2017 [employer]

## 2019-05-29 ENCOUNTER — OFFICE VISIT (OUTPATIENT)
Dept: MEDICAL GROUP | Facility: PHYSICIAN GROUP | Age: 39
End: 2019-05-29
Payer: COMMERCIAL

## 2019-05-29 VITALS
BODY MASS INDEX: 27.62 KG/M2 | SYSTOLIC BLOOD PRESSURE: 112 MMHG | HEIGHT: 67 IN | TEMPERATURE: 98.7 F | DIASTOLIC BLOOD PRESSURE: 78 MMHG | OXYGEN SATURATION: 96 % | HEART RATE: 74 BPM | WEIGHT: 176 LBS

## 2019-05-29 DIAGNOSIS — Z12.31 VISIT FOR SCREENING MAMMOGRAM: ICD-10-CM

## 2019-05-29 DIAGNOSIS — Z30.41 ENCOUNTER FOR SURVEILLANCE OF CONTRACEPTIVE PILLS: ICD-10-CM

## 2019-05-29 PROCEDURE — 99214 OFFICE O/P EST MOD 30 MIN: CPT | Performed by: NURSE PRACTITIONER

## 2019-05-29 NOTE — ASSESSMENT & PLAN NOTE
She gets severe cramping and heavy bleeding with her periods. This has been stable with CAT since 2014. She does not wish to change. She is >35 and is a current smoker, therefore this needs to be monitored very closely for her d/t increased risk, though it is not an absolute contraindication, she is aware she is at increased risk for DVT, MI, stroke. She has hx of headaches, though they are not typical of a migraine and she does not experience aura with headaches. PAP is UTD and is due 3/2020. Mammogram is due.

## 2019-05-31 NOTE — PROGRESS NOTES
"Subjective:     Chief Complaint   Patient presents with   • Contraception       HPI  Kalyn Addison is a 38 y.o. female here today for CAT refill    Contraception management  She gets severe cramping and heavy bleeding with her periods. This has been stable with CAT since 2014. She does not wish to change. She is >35 and is a current smoker, therefore this needs to be monitored very closely for her d/t increased risk, though it is not an absolute contraindication, she is aware she is at increased risk for DVT, MI, stroke. She has hx of headaches, though they are not typical of a migraine and she does not experience aura with headaches. PAP is UTD and is due 3/2020. Mammogram is due.        Diagnoses of Encounter for surveillance of contraceptive pills and Visit for screening mammogram were pertinent to this visit.    Allergies: Food; Amoxicillin; Clindamycin; Codeine; Prednisone; and Septra [bactrim]  Current medicines (including changes today)  Current Outpatient Prescriptions   Medication Sig Dispense Refill   • norgestrel-ethinyl estradiol (CRYSELLE-28) 0.3-30 MG-MCG Tab Take 1 Tab by mouth every day. 84 Tab 3   • tramadol (ULTRAM) 50 MG Tab Take 1-2 Tabs by mouth every four hours as needed for up to 5 days. 30 Tab 0   • methocarbamol (ROBAXIN) 750 MG Tab Take 1 Tab by mouth 4 times a day for 10 days. 40 Tab 0   • ondansetron (ZOFRAN ODT) 4 MG TABLET DISPERSIBLE Take 1 Tab by mouth every 8 hours as needed for up to 3 days. 10 Tab 0     No current facility-administered medications for this visit.        She  has a past medical history of GERD (gastroesophageal reflux disease) and Head ache. She also has no past medical history of Migraine.        ROS  As stated in HPI and additionally  CV: No chest pain      Objective:     /78 (BP Location: Left arm, Patient Position: Sitting, BP Cuff Size: Adult)   Pulse 74   Temp 37.1 °C (98.7 °F) (Temporal)   Ht 1.702 m (5' 7\")   Wt 79.8 kg (176 lb)   SpO2 96%  Body " mass index is 27.57 kg/m².  Physical Exam:  General: Alert, oriented, in no acute distress.  Eye contact is good, speech goal directed, affect calm  CNs grossly intact.  Gross hearing intact.  Gait steady.     Assessment and Plan:   Assessment/Plan:  1. Encounter for surveillance of contraceptive pills  Discussed with patient that she is at a fine line of being safe to use the oral contraception with estrogen.  I have highly encouraged her to quit smoking, or consider switching to a progesterone only pill.  She understands her risks for heart attack, stroke, and DVT does wish to continue use of current CAT at this time.  She will return for Pap smear in 2020.  - norgestrel-ethinyl estradiol (CRYSELLE-28) 0.3-30 MG-MCG Tab; Take 1 Tab by mouth every day.  Dispense: 84 Tab; Refill: 3    2. Visit for screening mammogram  - MA-SCREEN MAMMO W/CAD-BILAT; Future       Follow up:  Return if symptoms worsen or fail to improve.    Educated in proper administration of medication(s) ordered today including safety, possible SE, risks, benefits, rationale and alternatives to therapy.   Supportive care, differential diagnoses, and indications for immediate follow-up discussed with patient.    Pathogenesis of diagnosis discussed including typical length and natural progression.    Instructed to return to clinic or nearest emergency department for any change in condition, further concerns, or worsening of symptoms.  Patient states understanding of the plan of care and discharge instructions.      Please note that this dictation was created using voice recognition software. I have made every reasonable attempt to correct obvious errors, but I expect that there are errors of grammar and possibly content that I did not discover before finalizing the note.    Followup: Return if symptoms worsen or fail to improve. sooner should new symptoms or problems arise.

## 2019-09-03 NOTE — TELEPHONE ENCOUNTER
Phone Number Called: [phone] (home)       Call outcome: unable to leave message for pt her voicemail is full     Message: pt does not need refills at this time she was given a years supply from Michael GONGORA    norgestrel-ethinyl estradiol (CRYSELLE-28) 0.3-30 MG-MCG Tab 84 Tab 3/3 5/29/2019     Sig - Route: Take 1 Tab by mouth every day. - Oral    Sent to pharmacy as: Norgestrel-Ethinyl Estradiol 0.3-30 MG-MCG Oral Tablet    E-Prescribing Status: Receipt confirmed by pharmacy (5/29/2019  5:03 PM PDT)        Pt will need to find new PCP as she has not seen Dr Israel in 5 years. Michael has left the office

## 2019-09-03 NOTE — TELEPHONE ENCOUNTER
Was the patient seen in the last year in this department? Yes/saw Michael in May of 2019  Has not seen Dr Israel in more than 5 years   Does patient have an active prescription for medications requested?     norgestrel-ethinyl estradiol (CRYSELLE-28) 0.3-30 MG-MCG Tab 84 Tab 3/3 5/29/2019     Sig - Route: Take 1 Tab by mouth every day. - Oral    Sent to pharmacy as: Norgestrel-Ethinyl Estradiol 0.3-30 MG-MCG Oral Tablet    E-Prescribing Status: Receipt confirmed by pharmacy (5/29/2019  5:03 PM PDT)          Received Request Via: Patient

## 2020-11-05 DIAGNOSIS — Z30.41 ENCOUNTER FOR SURVEILLANCE OF CONTRACEPTIVE PILLS: ICD-10-CM

## 2020-11-05 RX ORDER — NORGESTREL-ETHINYL ESTRADIOL 0.3-0.03MG
1 TABLET ORAL
Qty: 84 TAB | Refills: 0 | Status: SHIPPED | OUTPATIENT
Start: 2020-11-05 | End: 2021-02-16 | Stop reason: SDUPTHER

## 2020-11-05 NOTE — TELEPHONE ENCOUNTER
Pt has been advised that she needed to establish care with a new PCP for future and all refills and this is her last refill.    Received request via: Patient    Was the patient seen in the last year in this department? Yes LOV 05/29/2019 With Michael Lehman  .   Does the patient have an active prescription (recently filled or refills available) for medication(s) requested? No

## 2021-02-16 DIAGNOSIS — Z30.41 ENCOUNTER FOR SURVEILLANCE OF CONTRACEPTIVE PILLS: ICD-10-CM

## 2021-02-16 RX ORDER — NORGESTREL-ETHINYL ESTRADIOL 0.3-0.03MG
1 TABLET ORAL
Qty: 84 TABLET | Refills: 0 | Status: SHIPPED | OUTPATIENT
Start: 2021-02-16 | End: 2021-03-29 | Stop reason: SDUPTHER

## 2021-02-16 NOTE — TELEPHONE ENCOUNTER
Received request via: Patient    Was the patient seen in the last year in this department? No  LOV 05/29/2019. Pt is schedule to establish care with Dr. Amaya on 03/29/2021. Pt has been advise if she does not make this appointment we will not be able to continue medication refills. Please fill an courtesy  Rx.   Thank you    Does the patient have an active prescription (recently filled or refills available) for medication(s) requested? No

## 2021-02-17 NOTE — TELEPHONE ENCOUNTER
MA verified that the patient is still taking this, and is not pregnant.  Therefore, we will refill months, until next/establishing visit.

## 2021-03-24 ENCOUNTER — TELEPHONE (OUTPATIENT)
Dept: MEDICAL GROUP | Facility: PHYSICIAN GROUP | Age: 41
End: 2021-03-24

## 2021-03-24 NOTE — TELEPHONE ENCOUNTER
Future Appointments       Provider Department Center    3/29/2021 8:00 AM Floyd Amaya M.D. [facility] VIS        NEW PATIENT VISIT PRE-VISIT PLANNING    1.  [employer] Patient is checked in Patient Demographics?Yes    2.  Immunizations were updated in Epic using Reconcile Outside Information activity? Yes         3.  Is this appointment scheduled as a Hospital Follow-Up? No    4.  Patient is due for the following Health Maintenance Topics:   Health Maintenance Due   Topic Date Due   • IMM PNEUMOCOCCAL VACCINE: 0-64 Years (1 of 1 - PPSV23) Never done   • MAMMOGRAM  Never done   • PAP SMEAR  03/27/2020   • IMM INFLUENZA (1) 09/01/2020     5.  Reviewed/Updated the following with patient:       •   Preferred Pharmacy? Yes       •   Preferred Lab? Yes       •   Preferred Communication? Yes       •   Allergies? Yes       •   Medications? YES. Was Abstract Encounter opened and chart updated? YES       •   Social History? Yes       •   Family History (document living status of immediate family members and if + hx of  cancer, diabetes, hypertension, hyperlipidemia, heart attack, stroke) Yes    6.  Updated Care Team?       •   DME Company (gait device, O2, CPAP, etc.) NO       •   Other Specialists (eye doctor, derm, GYN, cardiology, endo, etc): N\A    7.  AHA (Puls8) form printed for Provider? N/A   Patient advised to arrive 15 minutes prior to scheduled appointment

## 2021-03-29 ENCOUNTER — OFFICE VISIT (OUTPATIENT)
Dept: MEDICAL GROUP | Facility: PHYSICIAN GROUP | Age: 41
End: 2021-03-29

## 2021-03-29 VITALS
DIASTOLIC BLOOD PRESSURE: 74 MMHG | HEART RATE: 77 BPM | OXYGEN SATURATION: 96 % | TEMPERATURE: 97.7 F | SYSTOLIC BLOOD PRESSURE: 108 MMHG | BODY MASS INDEX: 26.68 KG/M2 | WEIGHT: 170 LBS | HEIGHT: 67 IN

## 2021-03-29 DIAGNOSIS — Z13.228 SCREENING FOR METABOLIC DISORDER: ICD-10-CM

## 2021-03-29 DIAGNOSIS — M54.50 CHRONIC BILATERAL LOW BACK PAIN WITHOUT SCIATICA: ICD-10-CM

## 2021-03-29 DIAGNOSIS — Z86.010 HISTORY OF COLONIC POLYPS: ICD-10-CM

## 2021-03-29 DIAGNOSIS — Z30.9 ENCOUNTER FOR CONTRACEPTIVE MANAGEMENT, UNSPECIFIED TYPE: ICD-10-CM

## 2021-03-29 DIAGNOSIS — G89.29 CHRONIC BILATERAL LOW BACK PAIN WITHOUT SCIATICA: ICD-10-CM

## 2021-03-29 DIAGNOSIS — F17.210 CIGARETTE SMOKER: ICD-10-CM

## 2021-03-29 PROCEDURE — 99203 OFFICE O/P NEW LOW 30 MIN: CPT | Performed by: STUDENT IN AN ORGANIZED HEALTH CARE EDUCATION/TRAINING PROGRAM

## 2021-03-29 RX ORDER — NORGESTREL-ETHINYL ESTRADIOL 0.3-0.03MG
1 TABLET ORAL
Qty: 84 TABLET | Refills: 0 | Status: SHIPPED | OUTPATIENT
Start: 2021-03-29 | End: 2021-07-28

## 2021-03-29 ASSESSMENT — PATIENT HEALTH QUESTIONNAIRE - PHQ9
SUM OF ALL RESPONSES TO PHQ QUESTIONS 1-9: 5
CLINICAL INTERPRETATION OF PHQ2 SCORE: 1
5. POOR APPETITE OR OVEREATING: 1 - SEVERAL DAYS

## 2021-03-29 NOTE — ASSESSMENT & PLAN NOTE
Patient previously had colonoscopy in 2017.  This was after some GI discomfort.  (she notes allergy to [place]).  GI recommendation: 12-year follow-up:  at 49 yo.  -Colonoscopy not needed until late 2028.

## 2021-03-29 NOTE — PATIENT INSTRUCTIONS
Please get the labs done in the next few days.  Please get them done while fasting (no food, coffee, or juices, for 8 hours - but water is ok).     Please follow-up with your GYN, when able.     Please return as needed, or in 1 year.   If there are lab abnormalities, then we may need to discuss that further.     Thank you.

## 2021-03-29 NOTE — ASSESSMENT & PLAN NOTE
Prior hx of cramping and heavy bleeding with periods.  Has been stable on OCP, since 2014.  Denies headaches or migraines.  Patient is currently a smoker.  Discussed increased risk, with the patient.  Pt aware of increased risk for DVT, MI, stroke.  Patient is between jobs, and waiting for new insurance,   before establishing with GYN.  -Will refill Cryselle-28, for now.  -Will get new labs.

## 2021-03-29 NOTE — ASSESSMENT & PLAN NOTE
Patient has been smoking since 11, with only very small.  Without smoking.  Between 1/2 to 1 pack/day.  Not interested in quitting.  Denies cough, wheeze, breathing issues.  -Counseled.  -Declined stopping.

## 2021-03-29 NOTE — PROGRESS NOTES
New to Provider  (and [facility])      Kalyn Addison is a 40 y.o. female.    Reason to establish: New patient to establish      Chief Complaint   Patient presents with   • Establish Care   • Medication Refill     birth control                 Problems addressed this visit:  Subjective HPI is included in Assessment & Plan, at bottom of note.   Problem   Chronic Bilateral Low Back Pain Without Sciatica   History of Colonic Polyps   Contraception Management   Cigarette Smoker               Past Medical History:   Diagnosis Date   • GERD (gastroesophageal reflux disease)        History reviewed. No pertinent surgical history.    Family History   Problem Relation Age of Onset   • Cancer Maternal Aunt         uterin ca   • Cancer Maternal Grandfather         prostrate cancer        Social History     Tobacco Use   • Smoking status: Current Every Day Smoker     Packs/day: 0.75     Years: 23.00     Pack years: 17.25     Types: Cigarettes   • Smokeless tobacco: Never Used   • Tobacco comment: Smoker   Substance Use Topics   • Alcohol use: Not Currently     Alcohol/week: 0.0 oz     Comment: 4 per month       Current Outpatient Medications   Medication Sig Dispense Refill   • norgestrel-ethinyl estradiol (CRYSELLE-28) 0.3-30 MG-MCG Tab Take 1 tablet by mouth every day. 84 tablet 0     No current facility-administered medications for this visit.       Allergies as of 03/29/2021 - Reviewed 03/29/2021   Allergen Reaction Noted   • Food  03/11/2013   • Amoxicillin Vomiting 02/26/2013   • Clindamycin Rash 03/31/2018   • Codeine Rash 02/26/2013   • Prednisone  03/06/2019   • Septra [bactrim] Rash 02/26/2013       Review of Symptoms  (as noted elsewhere, and .....)    GEN/CONST:   Denies fever, chills, fatigue,   CARDIO:   Denies chest pain, palpitations,    RESP:   Denies shortness of breath, wheezing, or coughing.  GI:    Denies nausea, vomiting, diarrhea,      ... distant hx of GERD, but not recently.   MSK:  +  "Occasional low back pain.       ... Managed with chiropractic.  No acute change.   NEURO:  Denies numbness or focal weakness.      PSYCH:  Denies anxiety, or substance abuse       + Notes some stress and feeling down from loss of job.   … However, denies depression.  (Mild score on screening).      Physical Exam  /74 (BP Location: Right arm, Patient Position: Sitting, BP Cuff Size: Adult)   Pulse 77   Temp 36.5 °C (97.7 °F) (Temporal)   Ht 1.702 m (5' 7\")   Wt 77.1 kg (170 lb)   SpO2 96%   BMI 26.63 kg/m²   General:  Alert and oriented, No apparent distress.  Eyes:  PER   EOMI.  No scleral icterus.    Neck: Supple. No lymphadenopathy noted. Thyroid not enlarged.   Lungs: Clear to auscultation bilaterally.  No wheezes or rales.     Cardiovascular: Regular rate and rhythm.  No murmurs, or gallops.  Abdomen:  Soft.  Non-distended.  Non-tender.     Extremities: No pedal edema.  Good general motion of extremities.    Psychological: Appears to have normal mood and affect.      Labs:  OLD / Prior labs reviewed. -getting new ones....  CBC, BMP and pap-path(nl,2017)                 Health Maintenance Review    influ vaccine - declines - (sick each time).   Pneumo Vacc - n/a  Tetanus - 2018   Shingles - n/a  Colonoscopy -  due at 48     had one 2017 (int.hem - f/u at 48).  Mammogram - declined  Pap - declined - will get new GYN, when new insurance.   Dexa - n/a  Labs < 12 mo / met screen - ordered.                 Assessment & Plan  (with subjective history and orders):    Problem List Items Addressed This Visit     Contraception management     Prior hx of cramping and heavy bleeding with periods.  Has been stable on OCP, since 2014.  Denies headaches or migraines.  Patient is currently a smoker.  Discussed increased risk, with the patient.  Pt aware of increased risk for DVT, MI, stroke.  Patient is between jobs, and waiting for new insurance,   before establishing with GYN.  -Will refill Cryselle-28, for " now.  -Will get new labs.         Relevant Medications    norgestrel-ethinyl estradiol (CRYSELLE-28) 0.3-30 MG-MCG Tab    Other Relevant Orders    CBC WITH DIFFERENTIAL    Comp Metabolic Panel    Cigarette smoker     Patient has been smoking since 11, with only very small.  Without smoking.  Between 1/2 to 1 pack/day.  Not interested in quitting.  Denies cough, wheeze, breathing issues.  -Counseled.  -Declined stopping.         Relevant Orders    CBC WITH DIFFERENTIAL    Comp Metabolic Panel    Lipid Profile    History of colonic polyps     Patient previously had colonoscopy in 2017.  This was after some GI discomfort.  (she notes allergy to [place]).  GI recommendation: 12-year follow-up:  at 47 yo.  -Colonoscopy not needed until late 2028.         Chronic bilateral low back pain without sciatica     Patient has distant history of back pain.  Notes it does not bother her currently.  States it is good as long as she is active.  Sees chiropractor every 2 weeks.   Dr. Otto Bell,?, at [facility].   Declines further evaluation at this time.  -No change in plan.            Other Visit Diagnoses     Screening for metabolic disorder        Relevant Orders    CBC WITH DIFFERENTIAL    Comp Metabolic Panel    Lipid Profile                    Diagnosis Table, with orders:  1. Encounter for contraceptive management, unspecified type  CBC WITH DIFFERENTIAL    Comp Metabolic Panel    norgestrel-ethinyl estradiol (CRYSELLE-28) 0.3-30 MG-MCG Tab   2. Cigarette smoker  CBC WITH DIFFERENTIAL    Comp Metabolic Panel    Lipid Profile   3. Screening for metabolic disorder  CBC WITH DIFFERENTIAL    Comp Metabolic Panel    Lipid Profile   4. Chronic bilateral low back pain without sciatica     5. History of colonic polyps                   Follow-up:  As needed, or 1 year, unless lab abnormality.                A computerized dictation system may have been used in this note.    Despite review, there may be some errors in  spelling or grammar.    Floyd Amaya M.D.  3/29/2021

## 2021-03-29 NOTE — ASSESSMENT & PLAN NOTE
Patient has distant history of back pain.  Notes it does not bother her currently.  States it is good as long as she is active.  Sees chiropractor every 2 weeks.   Dr. Otto Bell,?, at [facility].   Declines further evaluation at this time.  -No change in plan.

## 2021-08-22 DIAGNOSIS — Z30.9 ENCOUNTER FOR CONTRACEPTIVE MANAGEMENT, UNSPECIFIED TYPE: ICD-10-CM

## 2021-08-24 RX ORDER — NORGESTREL-ETHINYL ESTRADIOL 0.3-0.03MG
1 TABLET ORAL
Qty: 28 TABLET | Refills: 0 | OUTPATIENT
Start: 2021-08-24

## 2021-08-24 NOTE — TELEPHONE ENCOUNTER
Received request via: Pharmacy    Was the patient seen in the last year in this department? Yes    Does the patient have an active prescription (recently filled or refills available) for medication(s) requested? No     Pt informed she needs to get labs drawn

## 2021-08-25 NOTE — TELEPHONE ENCOUNTER
"Patient has not had the labs done that were requested 5 months ago.  … No prior labs in our system for past 5 years.  ...  Previously told of need to get the labs done in order to continue the medication, and this was added to the Rx-sig on last prescription (temporary) refill.    ...  Unless patient gets the basic labs, then it would be difficult to be a \"primary care provider\" when none of the tests were completed (leaving the PCP in an ethical / legal limbo).   - If no follow-up or labs done, then may need new PCP.     Patient is smoker, on est-containing OCP, and at increased risk of DVT and heart issues (per her preference, as discussed in prior notes).    ...  But continues to not get basic labs.   - Labs were required for future refills (of Cryselle-Jacqueline).    (as previously notified on first visit and last-Rx-sig).   - Refill declined (for now).     "

## 2021-09-15 NOTE — TELEPHONE ENCOUNTER
Pt informed of your message.     She does not have insurance at this time and cannot afford labs. Is there a specific lab out of the three you would like her to have drawn for her bcp refill? I can give her info for the cash lab.

## 2021-09-16 NOTE — TELEPHONE ENCOUNTER
Please call the patient, and let her know that she should go to whichever lab is cheapest (you may know more about the [facility] cash-lab, but online it looks like both the cash discount and [facility] will be about the same)...    -  She should have the CMP and pregnancy test done (since the med-refill timing suggests she has been off of this for a month, and it should not be used if liver dysfuntion)....   - If these labs are ok, I'll put-in a year's supply.  - If no labs are done, then I would not be able to refill these.  Thank you.     .................................  Re:  prior note(s)....  Notified that patient was finally contacted about the prior message (3 weeks ago), about needing lab work to be done, and difficulty being PCP without any follow-up or labs.  … Due to the patient's insurance change/loss, will allow the patient to try and have just the CMP done, to verify no liver dysfunction, for this medication.…  However, since she might have now been off of birth control for almost a month, she should verify if she has still been on it (and get the CMP done quickly).  Otherwise, I have placed a pregnancy test, if she has been off the medication during this time, she should have the pregnancy test done as well as the CMP, prior to refill.  Thank you.

## 2021-11-14 ENCOUNTER — OFFICE VISIT (OUTPATIENT)
Dept: URGENT CARE | Facility: PHYSICIAN GROUP | Age: 41
End: 2021-11-14
Payer: COMMERCIAL

## 2021-11-14 VITALS
HEART RATE: 103 BPM | TEMPERATURE: 98.5 F | BODY MASS INDEX: 26.68 KG/M2 | DIASTOLIC BLOOD PRESSURE: 82 MMHG | WEIGHT: 170 LBS | HEIGHT: 67 IN | RESPIRATION RATE: 16 BRPM | OXYGEN SATURATION: 94 % | SYSTOLIC BLOOD PRESSURE: 124 MMHG

## 2021-11-14 DIAGNOSIS — B34.9 VIRAL ILLNESS: ICD-10-CM

## 2021-11-14 DIAGNOSIS — R43.9 SENSE OF SMELL ALTERED: ICD-10-CM

## 2021-11-14 DIAGNOSIS — U07.1 COVID-19 VIRUS INFECTION: ICD-10-CM

## 2021-11-14 LAB
EXTERNAL QUALITY CONTROL: NORMAL
SARS-COV+SARS-COV-2 AG RESP QL IA.RAPID: POSITIVE

## 2021-11-14 PROCEDURE — 87426 SARSCOV CORONAVIRUS AG IA: CPT | Performed by: FAMILY MEDICINE

## 2021-11-14 PROCEDURE — 99213 OFFICE O/P EST LOW 20 MIN: CPT | Mod: CS | Performed by: FAMILY MEDICINE

## 2021-11-14 ASSESSMENT — ENCOUNTER SYMPTOMS: HEADACHES: 1

## 2021-11-14 NOTE — PROGRESS NOTES
"Shelly Addison is a 41 y.o. female who presents with Nasal Congestion (loss of taste/smell,[id number] )      - This is a pleasant and nontoxic appearing 41 y.o. female with c/o ~4 days ago developed some headaches, malaise stuffy/runny nose, today sense of smell/taste if off. Did a home C19 test and was +. Slight cough but no NVFC/cp/sob.        ALLERGIES:  Food, Amoxicillin, Clindamycin, Codeine, Prednisone, and Septra [bactrim]     PMH:  Past Medical History:   Diagnosis Date   • GERD (gastroesophageal reflux disease)         PSH:  History reviewed. No pertinent surgical history.    MEDS:    Current Outpatient Medications:   •  norgestrel-ethinyl estradiol (CRYSELLE-28) 0.3-30 MG-MCG Tab, Take 1 tablet by mouth every day. (NEED TO HAVE LABS DONE, FOR FUTURE REFILLS)., Disp: 28 tablet, Rfl: 0    ** I have documented what I find to be significant in regards to past medical, social, family and surgical history  in my HPI or under PMH/PSH/FH review section, otherwise it is noncontributory **           HPI    Review of Systems   HENT: Positive for congestion.    Neurological: Positive for headaches.   All other systems reviewed and are negative.             Objective     /82   Pulse (!) 103   Temp 36.9 °C (98.5 °F) (Temporal)   Resp 16   Ht 1.702 m (5' 7\")   Wt 77.1 kg (170 lb)   SpO2 94%   BMI 26.63 kg/m²      Physical Exam  Vitals and nursing note reviewed.   Constitutional:       General: She is not in acute distress.     Appearance: Normal appearance. She is well-developed.   HENT:      Head: Normocephalic and atraumatic.   Eyes:      General: No scleral icterus.  Cardiovascular:      Heart sounds: Normal heart sounds. No murmur heard.      Pulmonary:      Effort: Pulmonary effort is normal. No respiratory distress.      Breath sounds: Normal breath sounds.   Skin:     Coloration: Skin is not jaundiced or pale.   Neurological:      Mental Status: She is alert.      Motor: No abnormal " muscle tone.   Psychiatric:         Mood and Affect: Mood normal.         Behavior: Behavior normal.         Assessment & Plan       1. Viral illness     2. Sense of smell altered     3. COVID-19 virus infection  POCT SARS-COV Antigen JEREMIAH (Symptomatic Only)    CANCELED: SARS-CoV-2 PCR (24 hour In-House): Collect NP swab in VTM       - Dx, plan & d/c instructions discussed   - self isolate from 10 days from start of symptoms   - Rest, stay hydrated, OTC Motrin and/or Tylenol as needed  - E.R. precautions discussed     Asked to kindly follow up with their PCP's office in 2-3 days for a recheck, ER if not improving or feeling/getting worse.    Any realistic side effects of medications that may have been given today reviewed.     Patient left in stable condition     POCT results reviewed/discussed

## 2022-03-21 ENCOUNTER — TELEPHONE (OUTPATIENT)
Dept: SCHEDULING | Facility: IMAGING CENTER | Age: 42
End: 2022-03-21
Payer: COMMERCIAL

## 2022-04-14 ENCOUNTER — OFFICE VISIT (OUTPATIENT)
Dept: MEDICAL GROUP | Facility: PHYSICIAN GROUP | Age: 42
End: 2022-04-14
Payer: COMMERCIAL

## 2022-04-14 VITALS
HEIGHT: 67 IN | OXYGEN SATURATION: 98 % | WEIGHT: 170 LBS | TEMPERATURE: 98 F | BODY MASS INDEX: 26.68 KG/M2 | DIASTOLIC BLOOD PRESSURE: 80 MMHG | HEART RATE: 66 BPM | RESPIRATION RATE: 18 BRPM | SYSTOLIC BLOOD PRESSURE: 116 MMHG

## 2022-04-14 DIAGNOSIS — Z88.9 MULTIPLE ALLERGIES: ICD-10-CM

## 2022-04-14 DIAGNOSIS — Z30.9 ENCOUNTER FOR CONTRACEPTIVE MANAGEMENT, UNSPECIFIED TYPE: ICD-10-CM

## 2022-04-14 DIAGNOSIS — Z12.31 ENCOUNTER FOR SCREENING MAMMOGRAM FOR BREAST CANCER: ICD-10-CM

## 2022-04-14 DIAGNOSIS — F41.8 OTHER SPECIFIED ANXIETY DISORDERS: ICD-10-CM

## 2022-04-14 DIAGNOSIS — Z00.00 ENCOUNTER FOR MEDICAL EXAMINATION TO ESTABLISH CARE: ICD-10-CM

## 2022-04-14 PROCEDURE — 99214 OFFICE O/P EST MOD 30 MIN: CPT

## 2022-04-14 RX ORDER — NORGESTREL-ETHINYL ESTRADIOL 0.3-0.03MG
1 TABLET ORAL
Qty: 84 TABLET | Refills: 3 | Status: SHIPPED | OUTPATIENT
Start: 2022-04-14 | End: 2023-01-10

## 2022-04-14 ASSESSMENT — PATIENT HEALTH QUESTIONNAIRE - PHQ9: CLINICAL INTERPRETATION OF PHQ2 SCORE: 0

## 2022-04-14 NOTE — ASSESSMENT & PLAN NOTE
Patient has multiple allergies and wishes to discuss some of her anxieties regarding her food allergies today. She reports that she had some traumatizing experiences regarding her food allergies and now finds that she can't eat out at most restaurants, has to make her own food constantly, and is very fixated on possible reactions to different foods. She is unable to travel with her boyfriend for fear of not having her own food to take somewhere. This is affecting her quality of life.

## 2022-04-14 NOTE — PROGRESS NOTES
"CC:   Chief Complaint   Patient presents with   • Establish Care   • Contraception        HISTORY OF PRESENT ILLNESS: Patient is a 41 y.o. female established patient who presents today to discuss the following problems below:     Contraception management  Patient presents for a refill of her oral contraceptives. She believes she may be entering menopause due to some irregularities in her menstrual cycle. Patient does smoke and is aware of the risks of smoking with oral contraceptives including DVT and stroke.     Multiple allergies  Patient has multiple allergies and wishes to discuss some of her anxieties regarding her food allergies today. She reports that she had some traumatizing experiences regarding her food allergies and now finds that she can't eat out at most restaurants, has to make her own food constantly, and is very fixated on possible reactions to different foods. She is unable to travel with her boyfriend for fear of not having her own food to take somewhere. This is affecting her quality of life.     Past Medical History:   Diagnosis Date   • GERD (gastroesophageal reflux disease)        Allergies:Food, Amoxicillin, Clindamycin, Codeine, Prednisone, and Septra [bactrim]    Review of Systems: Otherwise negative except for as stated above.      Exam: /80 (BP Location: Left arm, Patient Position: Sitting, BP Cuff Size: Adult)   Pulse 66   Temp 36.7 °C (98 °F) (Temporal)   Resp 18   Ht 1.702 m (5' 7\")   Wt 77.1 kg (170 lb)   SpO2 98%  Body mass index is 26.63 kg/m².    Gen: Alert and oriented x4. Well developed, well-nourished female in no apparent distress.  Skin: Warm, dry, good turgor, no rashes in visible areas or lacerations appreciated.   Eye: EOM intact, pupils equal, round and reactive, conjunctiva clear, lids normal.  Neck: Trachea midline, no masses, no thyromegaly  GI:  Soft, non-tender abdomen with no distention.   MSK: Normal gait, moves all extremities.  Neuro: Alert and " oriented x 4, non-focal exam with motor and sensory grossly intact.  Ext: No clubbing, cyanosis, edema.  Psych: Normal behavior, affect and mood.      Assessment/Plan:  41 y.o. female with the following -    1. Encounter for medical examination to establish care  - Comp Metabolic Panel; Future  - CBC WITHOUT DIFFERENTIAL; Future  - Lipid Profile; Future  - TSH WITH REFLEX TO FT4; Future  - VITAMIN D,25 HYDROXY; Future    2. Encounter for contraceptive management, unspecified type  Chronic use, patient is aware of the risks of blood clots and would like to continue her oral contraceptives. Refill provided today  - norgestrel-ethinyl estradiol (CRYSELLE-28) 0.3-30 MG-MCG Tab; Take 1 Tablet by mouth every day.  Dispense: 84 Tablet; Refill: 3    3. Other specified anxiety disorders  Chronic condition, not well controlled.  Patient is very interested in establishing with a psychologist that could help her navigate some of her anxieties in relation to food.  Recommended patient establish with [facility], referral placed today  - Referral to Psychology    4. Encounter for screening mammogram for breast cancer  - MA-SCREENING MAMMO BILAT W/TOMOSYNTHESIS W/CAD; Future     Follow-up: Return in about 1 year (around 4/14/2023) for annual, schedule pap at any time.    Health Maintenance: Completed      Please note that this dictation was created using voice recognition software. I have made every reasonable attempt to correct obvious errors, but I expect that there are errors of grammar and possibly content that I did not discover before finalizing the note.    Electronically signed by GEORGE Zamudio on April 14, 2022

## 2022-04-14 NOTE — ASSESSMENT & PLAN NOTE
Patient presents for a refill of her oral contraceptives. She believes she may be entering menopause due to some irregularities in her menstrual cycle. Patient does smoke and is aware of the risks of smoking with oral contraceptives including DVT and stroke.

## 2022-06-18 ENCOUNTER — OFFICE VISIT (OUTPATIENT)
Dept: URGENT CARE | Facility: PHYSICIAN GROUP | Age: 42
End: 2022-06-18
Payer: COMMERCIAL

## 2022-06-18 VITALS
BODY MASS INDEX: 26.21 KG/M2 | OXYGEN SATURATION: 97 % | TEMPERATURE: 97.5 F | HEART RATE: 67 BPM | WEIGHT: 167 LBS | HEIGHT: 67 IN | RESPIRATION RATE: 18 BRPM | DIASTOLIC BLOOD PRESSURE: 82 MMHG | SYSTOLIC BLOOD PRESSURE: 124 MMHG

## 2022-06-18 DIAGNOSIS — R10.30 LOWER ABDOMINAL PAIN: ICD-10-CM

## 2022-06-18 DIAGNOSIS — K58.9 IRRITABLE BOWEL SYNDROME, UNSPECIFIED TYPE: ICD-10-CM

## 2022-06-18 LAB
APPEARANCE UR: CLEAR
BILIRUB UR STRIP-MCNC: NEGATIVE MG/DL
COLOR UR AUTO: YELLOW
GLUCOSE UR STRIP.AUTO-MCNC: NEGATIVE MG/DL
INT CON NEG: NORMAL
INT CON POS: NORMAL
KETONES UR STRIP.AUTO-MCNC: NEGATIVE MG/DL
LEUKOCYTE ESTERASE UR QL STRIP.AUTO: NEGATIVE
NITRITE UR QL STRIP.AUTO: NEGATIVE
PH UR STRIP.AUTO: 5.5 [PH] (ref 5–8)
POC URINE PREGNANCY TEST: NEGATIVE
PROT UR QL STRIP: NEGATIVE MG/DL
RBC UR QL AUTO: NORMAL
SP GR UR STRIP.AUTO: 1.01
UROBILINOGEN UR STRIP-MCNC: NORMAL MG/DL

## 2022-06-18 PROCEDURE — 99213 OFFICE O/P EST LOW 20 MIN: CPT | Performed by: STUDENT IN AN ORGANIZED HEALTH CARE EDUCATION/TRAINING PROGRAM

## 2022-06-18 PROCEDURE — 81025 URINE PREGNANCY TEST: CPT | Performed by: STUDENT IN AN ORGANIZED HEALTH CARE EDUCATION/TRAINING PROGRAM

## 2022-06-18 PROCEDURE — 81002 URINALYSIS NONAUTO W/O SCOPE: CPT | Performed by: STUDENT IN AN ORGANIZED HEALTH CARE EDUCATION/TRAINING PROGRAM

## 2022-06-18 ASSESSMENT — ENCOUNTER SYMPTOMS
ABDOMINAL PAIN: 1
HEARTBURN: 0
CHILLS: 0
HEADACHES: 0
WEIGHT LOSS: 0
HEMATOCHEZIA: 0
NAUSEA: 1
VOMITING: 0
FEVER: 0
CONSTIPATION: 0
SHORTNESS OF BREATH: 0
BLOOD IN STOOL: 0

## 2022-06-18 NOTE — PROGRESS NOTES
"Shelly Addison is a 41 y.o. female who presents with Abdominal Pain (X3days, this morning had bowel movement, loose stool.)            Pt presents today with day 3 of  Cramping abdominal pain. Last BM this morning and she also had been experiencing gas. Pt describes BM as \"apple sauce\" consistency. Pt states lower abdominal pain is \"crampy\" and comes in waves. She has history of constipation. Sx improve with heat and walking. Sx are worse with rest. Pt states she take activa daily. Pt has history of IBS. Pt has eliminated wheat from diet and has had improvement of symptoms. Pt does state she has had increased sexual activity with her fiance recently. LMP: 6/4-6/7.      Abdominal Pain  The pain is located in the RLQ and LLQ. The quality of the pain is colicky and cramping. Associated symptoms include diarrhea and nausea. Pertinent negatives include no constipation, dysuria, fever, frequency, headaches, hematochezia, hematuria, melena, vomiting or weight loss. The pain is aggravated by being still. The pain is relieved by activity. She has tried nothing for the symptoms.       Review of Systems   Constitutional: Negative for chills, fever, malaise/fatigue and weight loss.   HENT: Negative.    Respiratory: Negative for shortness of breath.    Cardiovascular: Negative for chest pain.   Gastrointestinal: Positive for abdominal pain, diarrhea and nausea. Negative for blood in stool, constipation, heartburn, hematochezia, melena and vomiting.   Genitourinary: Negative for dysuria, flank pain, frequency, hematuria and urgency.   Neurological: Negative for headaches.              Objective     /82   Pulse 67   Temp 36.4 °C (97.5 °F)   Resp 18   Ht 1.702 m (5' 7\")   Wt 75.8 kg (167 lb)   SpO2 97%   BMI 26.16 kg/m²      Physical Exam  Vitals reviewed.   Constitutional:       Appearance: Normal appearance.   Cardiovascular:      Rate and Rhythm: Normal rate and regular rhythm.      Heart sounds: Normal " heart sounds.   Pulmonary:      Effort: Pulmonary effort is normal.      Breath sounds: Normal breath sounds.   Abdominal:      General: Abdomen is flat. Bowel sounds are normal. There is no distension.      Palpations: Abdomen is soft. There is no mass.      Tenderness: There is abdominal tenderness in the right lower quadrant and left lower quadrant. There is no right CVA tenderness, left CVA tenderness, guarding or rebound. Negative signs include Baugh's sign, McBurney's sign, psoas sign and obturator sign.      Hernia: No hernia is present.   Skin:     General: Skin is warm and dry.   Neurological:      General: No focal deficit present.      Mental Status: She is alert and oriented to person, place, and time.                          Latest Reference Range & Units 06/18/22 11:32   POC Color Negative  yellow   POC Appearance Negative  clear   POC Specific Gravity <1.005 - >1.030  1.015   POC Urine PH 5.0 - 8.0  5.5   POC Glucose Negative mg/dL negative   POC Ketones Negative mg/dL negative   POC Protein Negative mg/dL negative   POC Nitrites Negative  negative   POC Leukocyte Esterase Negative  negative   POC Blood Negative  small**   POC Bilirubin Negative mg/dL negative   POC Urobiligen Negative (0.2) mg/dL 0.e.u      Latest Reference Range & Units 06/18/22 11:35   POC Urine Pregnancy Test Negative  negative       Assessment & Plan        1. Lower abdominal pain  - POCT Urinalysis  - POCT Pregnancy  - CT-ABDOMEN-PELVIS W/O            Pt presents today with abdominal pain for 3 days. Pt has history of IBS. Physical exam with minimal tenderness to RLQ/LLQ with no guarding or rebound tenderness. Vital signs are also stable. No signs of acute abdomen. POCT urinalysis and pregnancy negative in office. Ct abdomen without contrast order placed to rule out diverticulitis. Pt will be called with results when completed.     Abdominal pain most likely secondary to history of IBS. Pt provided with educational print outs  and supportive measures for managment of IBS.    Pt educated that with IBS, it is important to eat the foods and follow the eating habits that are best for your condition and choosing the right foods can help to ease the discomfort that comes from symptoms. Recommended that pt keeps a food diary. This will help you identify foods that cause symptoms.    I personally reviewed prior external notes and test results pertinent to today's visit.    Differential diagnoses, supportive care, and indications for immediate follow-up discussed with patient. Pathogenesis of diagnosis discussed including typical length and natural progression.      Instructed pt to return to urgent care or nearest emergency department if symptoms fail to improve, for any change in condition, further concerns, or new concerning symptoms.    Patient states understanding and agrees with the plan of care and discharge instructions.

## 2022-06-30 ASSESSMENT — ENCOUNTER SYMPTOMS
FLANK PAIN: 0
DIARRHEA: 1

## 2023-01-08 DIAGNOSIS — Z30.9 ENCOUNTER FOR CONTRACEPTIVE MANAGEMENT, UNSPECIFIED TYPE: ICD-10-CM

## 2023-01-10 RX ORDER — NORGESTREL-ETHINYL ESTRADIOL 0.3-0.03MG
1 TABLET ORAL
Qty: 84 TABLET | Refills: 2 | Status: SHIPPED | OUTPATIENT
Start: 2023-01-10 | End: 2023-03-08

## 2023-03-08 ENCOUNTER — OFFICE VISIT (OUTPATIENT)
Dept: MEDICAL GROUP | Facility: PHYSICIAN GROUP | Age: 43
End: 2023-03-08
Payer: COMMERCIAL

## 2023-03-08 VITALS
HEIGHT: 67 IN | SYSTOLIC BLOOD PRESSURE: 122 MMHG | HEART RATE: 70 BPM | TEMPERATURE: 97.6 F | BODY MASS INDEX: 25.82 KG/M2 | OXYGEN SATURATION: 97 % | DIASTOLIC BLOOD PRESSURE: 80 MMHG | WEIGHT: 164.5 LBS

## 2023-03-08 DIAGNOSIS — Z01.419 WELL WOMAN EXAM: ICD-10-CM

## 2023-03-08 DIAGNOSIS — F17.210 CIGARETTE SMOKER: Chronic | ICD-10-CM

## 2023-03-08 DIAGNOSIS — Z12.31 ENCOUNTER FOR SCREENING MAMMOGRAM FOR MALIGNANT NEOPLASM OF BREAST: ICD-10-CM

## 2023-03-08 DIAGNOSIS — Z30.9 ENCOUNTER FOR CONTRACEPTIVE MANAGEMENT, UNSPECIFIED TYPE: ICD-10-CM

## 2023-03-08 LAB
POCT INT CON NEG: NEGATIVE
POCT INT CON POS: POSITIVE
POCT URINE PREGNANCY TEST: NEGATIVE

## 2023-03-08 PROCEDURE — 81025 URINE PREGNANCY TEST: CPT | Performed by: INTERNAL MEDICINE

## 2023-03-08 PROCEDURE — 99406 BEHAV CHNG SMOKING 3-10 MIN: CPT | Performed by: INTERNAL MEDICINE

## 2023-03-08 PROCEDURE — 99214 OFFICE O/P EST MOD 30 MIN: CPT | Mod: 25 | Performed by: INTERNAL MEDICINE

## 2023-03-08 RX ORDER — NORGESTREL-ETHINYL ESTRADIOL 0.3-0.03MG
1 TABLET ORAL
Qty: 84 TABLET | Refills: 0 | Status: SHIPPED | OUTPATIENT
Start: 2023-03-08 | End: 2023-06-01

## 2023-03-08 ASSESSMENT — PATIENT HEALTH QUESTIONNAIRE - PHQ9
CLINICAL INTERPRETATION OF PHQ2 SCORE: 2
5. POOR APPETITE OR OVEREATING: 0 - NOT AT ALL
SUM OF ALL RESPONSES TO PHQ QUESTIONS 1-9: 7

## 2023-03-08 NOTE — PROGRESS NOTES
PRIMARY CARE CLINIC VISIT    Chief complaint:    Birth control pill refills  Referral to GYN  Request mammogram    History of Present Illness     Encounter for birth control  Pt requests birth control pills refills..    No family or personal history of clotting disorders, PEs, or DVTs.   No active migraines.   Patient smokes cigarettes currently . Strongly advised pt to quit due to increased risks of blood clots     Discussed with pt regarding the potential risks and side effects of bcp including the following but not limited to:   Menstrual syndrome, migraine/headaches, breast pain/discomfort/tenderness , Menstrual irregularity, nausea /vomiting, abdominal pain/discomfort, mood changes including affect lability, depression alteration of mood, mood swings ,irritability, Irregular uterine bleeding, Venous/arterial thromboembolic events may occur including possible DVT, PE, stroke, MI, intracardiac thrombosis, hypertension, weight changes, gallbladder disease, rash, uterine fibroid  etc. etc.     Pt voiced understanding and wishes to proceed to get the prescription.    POCT Urine Hcg ordered today    Last breast and pelvic/pap exam: few yrs ago . Per pts report  Referral submitted today.    Cigarette smoker  Chronic condition. Uncontrolled    I spent 5 minutes of face to face counseling pt regarding nicotine cessation.   Discussed w pt and counseling on harmful effects of nicotine.     Strongly advised pt to quit.      No current outpatient medications on file prior to visit.     No current facility-administered medications on file prior to visit.        Allergies: Food, Amoxicillin, Clindamycin, Codeine, Prednisone, and Septra [bactrim]    Current Outpatient Medications Ordered in Epic   Medication Sig Dispense Refill    norgestrel-ethinyl estradiol (CRYSELLE-28) 0.3-30 MG-MCG Tab Take 1 Tablet by mouth every day. 84 Tablet 0     No current Epic-ordered facility-administered medications on file.       Past Medical  "History:   Diagnosis Date    GERD (gastroesophageal reflux disease)        No past surgical history on file.    Family History   Problem Relation Age of Onset    Cancer Maternal Aunt         uterin ca    Cancer Maternal Grandfather         prostrate cancer        Social History     Tobacco Use   Smoking Status Every Day    Packs/day: 0.75    Years: 23.00    Pack years: 17.25    Types: Cigarettes   Smokeless Tobacco Never   Tobacco Comments    Smoker       Social History     Substance and Sexual Activity   Alcohol Use Not Currently    Alcohol/week: 0.0 oz    Comment: 4 per month       Review of systems.  As per HPI above. All other systems reviewed and negative.      Past Medical, Social, and Family history reviewed and updated in EPIC     Objective     /80 (BP Location: Left arm, Patient Position: Sitting, BP Cuff Size: Adult)   Pulse 70   Temp 36.4 °C (97.6 °F) (Temporal)   Ht 1.702 m (5' 7\")   Wt 74.6 kg (164 lb 8 oz)   SpO2 97%    Body mass index is 25.76 kg/m².    General: alert in no apparent distress.  Cardiovascular: regular rate and rhythm  Pulmonary: lungs : no wheezing   Gastrointestinal: BS present. No obvious mass noted  Neuro nonfocal cranial nerves II to XII grossly intact          Lab Results   Component Value Date/Time    WBC 7.7 12/02/2016 12:09 PM    HEMOGLOBIN 14.6 12/02/2016 12:09 PM    HEMATOCRIT 43.3 12/02/2016 12:09 PM    MCV 94.3 12/02/2016 12:09 PM    PLATELETCT 249 12/02/2016 12:09 PM         Lab Results   Component Value Date/Time    SODIUM 135 12/02/2016 12:09 PM    POTASSIUM 3.9 12/02/2016 12:09 PM    GLUCOSE 87 12/02/2016 12:09 PM    BUN 18 12/02/2016 12:09 PM    CREATININE 0.81 12/02/2016 12:09 PM       No results found for: CHOLSTRLTOT, LDL, HDL, TRIGLYCERIDE    Lab Results   Component Value Date/Time    ALTSGPT 13 12/02/2016 12:09 PM             Assessment and Plan     1. Encounter for contraceptive management, unspecified type  - POCT PREGNANCY  - norgestrel-ethinyl " estradiol (CRYSELLE-28) 0.3-30 MG-MCG Tab; Take 1 Tablet by mouth every day.  Dispense: 84 Tablet; Refill: 0    Refill for birth control pills sent to the pharmacy.  Advised the patient to follow-up with PCP and gynecology  Strongly advised the patient to quit smoking due to increased risks of blood clots.    2. Cigarette smoker  Chronic condition.  Uncontrolled.  Strongly advised the patient to quit smoking.    3. Well woman exam  - Referral to Gynecology    4. Encounter for screening mammogram for malignant neoplasm of breast  - MA-SCREENING MAMMO BILAT W/TOMOSYNTHESIS W/CAD; Future    Total time:   31   min -  That includes time for chart review before the visit, the actual patient visit, and time spent on documentation in EMR after the visit.  Chart review/prep, review of other providers' records, imaging/lab review, face-to-face time for history/examination, ordering, prescribing,  review of results/meds/ treatment plan with patient, and care coordination.             Please note that this dictation was created using voice recognition software. I have made every reasonable attempt to correct obvious errors, but I expect that there are errors of grammar and possibly content that I did not discover before finalizing the note.    Cesar Talavera MD  Internal Medicine  [facility]

## 2023-03-08 NOTE — ASSESSMENT & PLAN NOTE
Pt requests birth control pills refills..    No family or personal history of clotting disorders, PEs, or DVTs.   No active migraines.   Patient smokes cigarettes currently . Strongly advised pt to quit due to increased risks of blood clots     Discussed with pt regarding the potential risks and side effects of bcp including the following but not limited to:   Menstrual syndrome, migraine/headaches, breast pain/discomfort/tenderness , Menstrual irregularity, nausea /vomiting, abdominal pain/discomfort, mood changes including affect lability, depression alteration of mood, mood swings ,irritability, Irregular uterine bleeding, Venous/arterial thromboembolic events may occur including possible DVT, PE, stroke, MI, intracardiac thrombosis, hypertension, weight changes, gallbladder disease, rash, uterine fibroid  etc. etc.     Pt voiced understanding and wishes to proceed to get the prescription.    POCT Urine Hcg ordered today    Last breast and pelvic/pap exam: few yrs ago . Per pts report  Referral submitted today.

## 2023-03-20 ENCOUNTER — HOSPITAL ENCOUNTER (OUTPATIENT)
Dept: RADIOLOGY | Facility: MEDICAL CENTER | Age: 43
End: 2023-03-20
Attending: INTERNAL MEDICINE
Payer: COMMERCIAL

## 2023-03-20 DIAGNOSIS — Z12.31 ENCOUNTER FOR SCREENING MAMMOGRAM FOR MALIGNANT NEOPLASM OF BREAST: ICD-10-CM

## 2023-03-20 PROCEDURE — 77063 BREAST TOMOSYNTHESIS BI: CPT

## 2024-01-10 DIAGNOSIS — Z30.9 ENCOUNTER FOR CONTRACEPTIVE MANAGEMENT, UNSPECIFIED TYPE: ICD-10-CM

## 2024-01-15 RX ORDER — NORGESTREL-ETHINYL ESTRADIOL 0.3-0.03MG
1 TABLET ORAL
Qty: 84 TABLET | Refills: 2 | Status: SHIPPED | OUTPATIENT
Start: 2024-01-15 | End: 2024-01-21 | Stop reason: SDUPTHER

## 2024-01-21 DIAGNOSIS — Z30.9 ENCOUNTER FOR CONTRACEPTIVE MANAGEMENT, UNSPECIFIED TYPE: ICD-10-CM

## 2024-01-22 NOTE — TELEPHONE ENCOUNTER
Received request via: Pharmacy    Was the patient seen in the last year in this department? Yes    Does the patient have an active prescription (recently filled or refills available) for medication(s) requested? No    [facility]    Does the patient have USP Plus and need 100 day supply (blood pressure, diabetes and cholesterol meds only)? Patient does not have SCP     The Rx sent was for the generic brand. The pharmacist said to ask specifically for Cryselle. Please refill cryselle. Thank you.

## 2024-01-24 RX ORDER — NORGESTREL-ETHINYL ESTRADIOL 0.3-0.03MG
1 TABLET ORAL
Qty: 84 TABLET | Refills: 2 | Status: SHIPPED | OUTPATIENT
Start: 2024-01-24

## 2024-10-31 ENCOUNTER — OFFICE VISIT (OUTPATIENT)
Dept: URGENT CARE | Facility: PHYSICIAN GROUP | Age: 44
End: 2024-10-31
Payer: MEDICAID

## 2024-10-31 VITALS
SYSTOLIC BLOOD PRESSURE: 126 MMHG | DIASTOLIC BLOOD PRESSURE: 72 MMHG | HEART RATE: 86 BPM | TEMPERATURE: 97 F | WEIGHT: 165.24 LBS | HEIGHT: 66 IN | BODY MASS INDEX: 26.56 KG/M2 | RESPIRATION RATE: 12 BRPM | OXYGEN SATURATION: 96 %

## 2024-10-31 DIAGNOSIS — Z76.0 MEDICATION REFILL: ICD-10-CM

## 2024-10-31 DIAGNOSIS — Z30.9 ENCOUNTER FOR CONTRACEPTIVE MANAGEMENT, UNSPECIFIED TYPE: ICD-10-CM

## 2024-10-31 RX ORDER — NORGESTREL-ETHINYL ESTRADIOL 0.3-0.03MG
1 TABLET ORAL
Qty: 84 TABLET | Refills: 0 | Status: SHIPPED | OUTPATIENT
Start: 2024-10-31 | End: 2024-10-31

## 2024-10-31 RX ORDER — NORGESTREL-ETHINYL ESTRADIOL 0.3-0.03MG
1 TABLET ORAL
Qty: 84 TABLET | Refills: 0 | Status: SHIPPED | OUTPATIENT
Start: 2024-10-31

## 2024-10-31 ASSESSMENT — ENCOUNTER SYMPTOMS: SHORTNESS OF BREATH: 0

## 2024-12-09 ENCOUNTER — OFFICE VISIT (OUTPATIENT)
Dept: MEDICAL GROUP | Facility: PHYSICIAN GROUP | Age: 44
End: 2024-12-09

## 2024-12-09 VITALS
RESPIRATION RATE: 18 BRPM | SYSTOLIC BLOOD PRESSURE: 110 MMHG | OXYGEN SATURATION: 96 % | WEIGHT: 169 LBS | HEIGHT: 66 IN | TEMPERATURE: 97.7 F | BODY MASS INDEX: 27.16 KG/M2 | HEART RATE: 71 BPM | DIASTOLIC BLOOD PRESSURE: 60 MMHG

## 2024-12-09 DIAGNOSIS — Z11.59 NEED FOR HEPATITIS C SCREENING TEST: ICD-10-CM

## 2024-12-09 DIAGNOSIS — Z13.29 SCREENING FOR ENDOCRINE, NUTRITIONAL, METABOLIC AND IMMUNITY DISORDER: ICD-10-CM

## 2024-12-09 DIAGNOSIS — Z13.21 SCREENING FOR ENDOCRINE, NUTRITIONAL, METABOLIC AND IMMUNITY DISORDER: ICD-10-CM

## 2024-12-09 DIAGNOSIS — Z13.0 SCREENING FOR ENDOCRINE, NUTRITIONAL, METABOLIC AND IMMUNITY DISORDER: ICD-10-CM

## 2024-12-09 DIAGNOSIS — Z13.228 SCREENING FOR ENDOCRINE, NUTRITIONAL, METABOLIC AND IMMUNITY DISORDER: ICD-10-CM

## 2024-12-09 DIAGNOSIS — Z30.9 ENCOUNTER FOR CONTRACEPTIVE MANAGEMENT, UNSPECIFIED TYPE: ICD-10-CM

## 2024-12-09 DIAGNOSIS — Z11.4 SCREENING FOR HIV (HUMAN IMMUNODEFICIENCY VIRUS): ICD-10-CM

## 2024-12-09 PROCEDURE — 3078F DIAST BP <80 MM HG: CPT

## 2024-12-09 PROCEDURE — 99213 OFFICE O/P EST LOW 20 MIN: CPT

## 2024-12-09 PROCEDURE — 3074F SYST BP LT 130 MM HG: CPT

## 2024-12-09 RX ORDER — NORGESTREL-ETHINYL ESTRADIOL 0.3-0.03MG
1 TABLET ORAL
Qty: 84 TABLET | Refills: 3 | Status: SHIPPED | OUTPATIENT
Start: 2024-12-09

## 2024-12-09 ASSESSMENT — PATIENT HEALTH QUESTIONNAIRE - PHQ9: CLINICAL INTERPRETATION OF PHQ2 SCORE: 0

## 2024-12-09 NOTE — PROGRESS NOTES
Verbal consent was acquired by the patient to use "[employer] ambient listening note generation during this visit     Subjective:     HPI:   History of Present Illness  The patient is a 4-year-old female presenting to obtain refill for current birth control.    She reports that her current birth control method is effective and she is satisfied with it. She has not exhausted her supply since her last visit to urgent care. Her menstrual cycle is due next week, at which point she will commence the new pack of birth control pills. She is currently without insurance due to job loss, but anticipates regaining coverage soon as she has secured employment at [employer], although she has not yet officially started.        Assessment & Plan:     Problem List Items Addressed This Visit       Contraception management    Relevant Medications    CRYSELLE-28 0.3-30 MG-MCG Tab     Other Visit Diagnoses       Screening for HIV (human immunodeficiency virus)        Relevant Orders    HIV AG/AB COMBO ASSAY SCREENING    Need for hepatitis C screening test        Relevant Orders    HEP C VIRUS ANTIBODY    Screening for endocrine, nutritional, metabolic and immunity disorder        Relevant Orders    CBC WITHOUT DIFFERENTIAL    Lipid Profile    TSH WITH REFLEX TO FT4    Comp Metabolic Panel              Assessment & Plan  1. Birth control refill.  This is a chronic, stable medical condition.   A prescription for her birth control, Cryselle, was renewed for the upcoming year. Lab orders were printed for her to complete once her insurance is reinstated. These labs include blood counts, liver function, kidney function, thyroid, cholesterol, and screenings for HIV and Hepatitis C.        Health Maintenance: Orders placed as applicable to patient     Objective:     Exam:  Objective:  Vitals:    12/09/24 1116   BP: 110/60   Pulse: 71   Resp: 18   Temp: 36.5 °C (97.7 °F)   SpO2: 96%     Physical Exam  Physical Exam    Constitutional:        Appearance: Normal appearance.   Eyes:      Extraocular Movements: Extraocular movements intact.   Pulmonary:      Effort: Pulmonary effort is normal.   Neurological:      General: No focal deficit present.      Mental Status: She is alert and oriented to person, place, and time.   Psychiatric:         Mood and Affect: Mood normal.         Behavior: Behavior normal.       Return in about 6 months (around 6/9/2025).    Please note that this dictation was created using voice recognition software. I have made every reasonable attempt to correct obvious errors, but I expect that there are errors of grammar and possibly content that I did not discover before finalizing the note.